# Patient Record
Sex: MALE | Race: WHITE | NOT HISPANIC OR LATINO | Employment: FULL TIME | URBAN - METROPOLITAN AREA
[De-identification: names, ages, dates, MRNs, and addresses within clinical notes are randomized per-mention and may not be internally consistent; named-entity substitution may affect disease eponyms.]

---

## 2022-03-23 ENCOUNTER — HOSPITAL ENCOUNTER (INPATIENT)
Facility: MEDICAL CENTER | Age: 62
LOS: 5 days | DRG: 246 | End: 2022-03-28
Attending: INTERNAL MEDICINE | Admitting: INTERNAL MEDICINE
Payer: COMMERCIAL

## 2022-03-23 DIAGNOSIS — I50.21 ACUTE SYSTOLIC HEART FAILURE (HCC): ICD-10-CM

## 2022-03-23 DIAGNOSIS — I25.10 CORONARY ARTERY DISEASE DUE TO LIPID RICH PLAQUE: ICD-10-CM

## 2022-03-23 DIAGNOSIS — I25.83 CORONARY ARTERY DISEASE DUE TO LIPID RICH PLAQUE: ICD-10-CM

## 2022-03-23 DIAGNOSIS — I42.0 DILATED CARDIOMYOPATHY (HCC): ICD-10-CM

## 2022-03-23 PROBLEM — E11.9 DIABETES (HCC): Status: ACTIVE | Noted: 2022-03-23

## 2022-03-23 PROBLEM — R94.31 ABNORMAL EKG: Status: ACTIVE | Noted: 2022-03-23

## 2022-03-23 PROBLEM — E78.5 DYSLIPIDEMIA: Status: ACTIVE | Noted: 2022-03-23

## 2022-03-23 LAB
APTT PPP: 34.8 SEC (ref 24.7–36)
APTT PPP: 55.5 SEC (ref 24.7–36)
INR PPP: 1 (ref 0.87–1.13)
INR PPP: 1.07 (ref 0.87–1.13)
PROTHROMBIN TIME: 12.9 SEC (ref 12–14.6)
PROTHROMBIN TIME: 13.6 SEC (ref 12–14.6)
TROPONIN T SERPL-MCNC: 18 NG/L (ref 6–19)
UFH PPP CHRO-ACNC: 0.16 IU/ML
UFH PPP CHRO-ACNC: <0.1 IU/ML

## 2022-03-23 PROCEDURE — 36415 COLL VENOUS BLD VENIPUNCTURE: CPT

## 2022-03-23 PROCEDURE — 85520 HEPARIN ASSAY: CPT | Mod: 91

## 2022-03-23 PROCEDURE — 85730 THROMBOPLASTIN TIME PARTIAL: CPT

## 2022-03-23 PROCEDURE — 700102 HCHG RX REV CODE 250 W/ 637 OVERRIDE(OP): Performed by: INTERNAL MEDICINE

## 2022-03-23 PROCEDURE — 99223 1ST HOSP IP/OBS HIGH 75: CPT | Performed by: INTERNAL MEDICINE

## 2022-03-23 PROCEDURE — 84484 ASSAY OF TROPONIN QUANT: CPT

## 2022-03-23 PROCEDURE — 700111 HCHG RX REV CODE 636 W/ 250 OVERRIDE (IP): Performed by: INTERNAL MEDICINE

## 2022-03-23 PROCEDURE — A9270 NON-COVERED ITEM OR SERVICE: HCPCS | Performed by: INTERNAL MEDICINE

## 2022-03-23 PROCEDURE — 770020 HCHG ROOM/CARE - TELE (206)

## 2022-03-23 PROCEDURE — 85610 PROTHROMBIN TIME: CPT | Mod: 91

## 2022-03-23 RX ORDER — HEPARIN SODIUM 5000 [USP'U]/100ML
0-30 INJECTION, SOLUTION INTRAVENOUS CONTINUOUS
Status: DISCONTINUED | OUTPATIENT
Start: 2022-03-23 | End: 2022-03-27

## 2022-03-23 RX ORDER — HEPARIN SODIUM 5000 [USP'U]/100ML
0-30 INJECTION, SOLUTION INTRAVENOUS CONTINUOUS
Status: DISCONTINUED | OUTPATIENT
Start: 2022-03-23 | End: 2022-03-23

## 2022-03-23 RX ORDER — ENALAPRIL MALEATE 2.5 MG/1
2.5 TABLET ORAL TWICE DAILY
Status: DISCONTINUED | OUTPATIENT
Start: 2022-03-23 | End: 2022-03-24

## 2022-03-23 RX ORDER — ACETAMINOPHEN 325 MG/1
650 TABLET ORAL EVERY 6 HOURS PRN
Status: ACTIVE | OUTPATIENT
Start: 2022-03-23 | End: 2022-03-23

## 2022-03-23 RX ORDER — HEPARIN SODIUM 1000 [USP'U]/ML
60 INJECTION, SOLUTION INTRAVENOUS; SUBCUTANEOUS ONCE
Status: DISCONTINUED | OUTPATIENT
Start: 2022-03-23 | End: 2022-03-23

## 2022-03-23 RX ORDER — ONDANSETRON 2 MG/ML
4 INJECTION INTRAMUSCULAR; INTRAVENOUS EVERY 4 HOURS PRN
Status: ACTIVE | OUTPATIENT
Start: 2022-03-23 | End: 2022-03-23

## 2022-03-23 RX ORDER — HEPARIN SODIUM 1000 [USP'U]/ML
40 INJECTION, SOLUTION INTRAVENOUS; SUBCUTANEOUS PRN
Status: DISCONTINUED | OUTPATIENT
Start: 2022-03-23 | End: 2022-03-27

## 2022-03-23 RX ORDER — HEPARIN SODIUM 1000 [USP'U]/ML
30 INJECTION, SOLUTION INTRAVENOUS; SUBCUTANEOUS PRN
Status: DISCONTINUED | OUTPATIENT
Start: 2022-03-23 | End: 2022-03-23

## 2022-03-23 RX ORDER — NITROGLYCERIN 0.4 MG/1
0.4 TABLET SUBLINGUAL
Status: DISCONTINUED | OUTPATIENT
Start: 2022-03-23 | End: 2022-03-28 | Stop reason: HOSPADM

## 2022-03-23 RX ORDER — LABETALOL HYDROCHLORIDE 5 MG/ML
10 INJECTION, SOLUTION INTRAVENOUS EVERY 4 HOURS PRN
Status: ACTIVE | OUTPATIENT
Start: 2022-03-23 | End: 2022-03-23

## 2022-03-23 RX ORDER — ATORVASTATIN CALCIUM 40 MG/1
40 TABLET, FILM COATED ORAL NIGHTLY
COMMUNITY

## 2022-03-23 RX ORDER — MORPHINE SULFATE 4 MG/ML
2-4 INJECTION INTRAVENOUS
Status: DISCONTINUED | OUTPATIENT
Start: 2022-03-23 | End: 2022-03-28 | Stop reason: HOSPADM

## 2022-03-23 RX ADMIN — HEPARIN SODIUM 18 UNITS/KG/HR: 5000 INJECTION, SOLUTION INTRAVENOUS at 22:59

## 2022-03-23 RX ADMIN — ENALAPRIL MALEATE 2.5 MG: 2.5 TABLET ORAL at 23:03

## 2022-03-23 RX ADMIN — METOPROLOL TARTRATE 25 MG: 25 TABLET, FILM COATED ORAL at 23:04

## 2022-03-23 ASSESSMENT — LIFESTYLE VARIABLES
AVERAGE NUMBER OF DAYS PER WEEK YOU HAVE A DRINK CONTAINING ALCOHOL: 0
HAVE YOU EVER FELT YOU SHOULD CUT DOWN ON YOUR DRINKING: NO
TOTAL SCORE: 0
ON A TYPICAL DAY WHEN YOU DRINK ALCOHOL HOW MANY DRINKS DO YOU HAVE: 0
TOTAL SCORE: 0
CONSUMPTION TOTAL: NEGATIVE
TOTAL SCORE: 0
HAVE PEOPLE ANNOYED YOU BY CRITICIZING YOUR DRINKING: NO
ALCOHOL_USE: NO
HOW MANY TIMES IN THE PAST YEAR HAVE YOU HAD 5 OR MORE DRINKS IN A DAY: 0
EVER HAD A DRINK FIRST THING IN THE MORNING TO STEADY YOUR NERVES TO GET RID OF A HANGOVER: NO
DOES PATIENT WANT TO STOP DRINKING: NO
EVER FELT BAD OR GUILTY ABOUT YOUR DRINKING: NO

## 2022-03-23 ASSESSMENT — COGNITIVE AND FUNCTIONAL STATUS - GENERAL
MOBILITY SCORE: 24
SUGGESTED CMS G CODE MODIFIER MOBILITY: CH
DAILY ACTIVITIY SCORE: 24
SUGGESTED CMS G CODE MODIFIER DAILY ACTIVITY: CH

## 2022-03-23 ASSESSMENT — ENCOUNTER SYMPTOMS
INSOMNIA: 0
NAUSEA: 0
FEVER: 0
NECK PAIN: 0
MYALGIAS: 0
VOMITING: 0
DOUBLE VISION: 0
SORE THROAT: 0
HEADACHES: 0
CLAUDICATION: 0
BRUISES/BLEEDS EASILY: 0
HEMOPTYSIS: 0
PALPITATIONS: 0
STRIDOR: 0
WEIGHT LOSS: 0
DEPRESSION: 0
COUGH: 0
DIZZINESS: 0
PND: 0
BLURRED VISION: 0

## 2022-03-23 ASSESSMENT — PATIENT HEALTH QUESTIONNAIRE - PHQ9
SUM OF ALL RESPONSES TO PHQ9 QUESTIONS 1 AND 2: 0
2. FEELING DOWN, DEPRESSED, IRRITABLE, OR HOPELESS: NOT AT ALL
1. LITTLE INTEREST OR PLEASURE IN DOING THINGS: NOT AT ALL

## 2022-03-23 ASSESSMENT — PAIN DESCRIPTION - PAIN TYPE: TYPE: ACUTE PAIN

## 2022-03-23 NOTE — LETTER
March 28, 2022    To Whom It May Concern:         Please accept this letter as confirmation that Makayla Hays was admitted to [facility] on 3/23/22- 3/28/22 for treatment of cardiac conditions. Further treatment of his cardiac conditions will include 12 weeks of cardiac rehab at least 2 days a week, which he can complete at [facility].          If you have any questions please do not hesitate to call me at the phone number listed below.    Sincerely,         LIA Porter.   [facility]  [phone]

## 2022-03-24 ENCOUNTER — APPOINTMENT (OUTPATIENT)
Dept: RADIOLOGY | Facility: MEDICAL CENTER | Age: 62
DRG: 246 | End: 2022-03-24
Attending: GENERAL PRACTICE
Payer: COMMERCIAL

## 2022-03-24 ENCOUNTER — APPOINTMENT (OUTPATIENT)
Dept: CARDIOLOGY | Facility: MEDICAL CENTER | Age: 62
DRG: 246 | End: 2022-03-24
Attending: GENERAL PRACTICE
Payer: COMMERCIAL

## 2022-03-24 PROBLEM — E66.9 OBESITY (BMI 35.0-39.9 WITHOUT COMORBIDITY): Status: ACTIVE | Noted: 2022-03-24

## 2022-03-24 PROBLEM — E11.65 UNCONTROLLED TYPE 2 DIABETES MELLITUS WITH HYPERGLYCEMIA (HCC): Status: ACTIVE | Noted: 2022-03-23

## 2022-03-24 PROBLEM — R94.39 ABNORMAL STRESS TEST: Status: ACTIVE | Noted: 2022-03-24

## 2022-03-24 LAB
AMPHET UR QL SCN: NEGATIVE
BARBITURATES UR QL SCN: NEGATIVE
BENZODIAZ UR QL SCN: NEGATIVE
BZE UR QL SCN: NEGATIVE
CANNABINOIDS UR QL SCN: NEGATIVE
CHOLEST SERPL-MCNC: 104 MG/DL (ref 100–199)
EKG IMPRESSION: NORMAL
EST. AVERAGE GLUCOSE BLD GHB EST-MCNC: 166 MG/DL
HBA1C MFR BLD: 7.4 % (ref 4–5.6)
HDLC SERPL-MCNC: 40 MG/DL
LDLC SERPL CALC-MCNC: 45 MG/DL
LV EJECT FRACT  99904: 35
LV EJECT FRACT  99904: 35
LV EJECT FRACT MOD 2C 99903: 55.53
LV EJECT FRACT MOD 2C 99903: 55.53
LV EJECT FRACT MOD 4C 99902: 46.01
LV EJECT FRACT MOD 4C 99902: 46.01
LV EJECT FRACT MOD BP 99901: 53.11
LV EJECT FRACT MOD BP 99901: 53.11
METHADONE UR QL SCN: NEGATIVE
OPIATES UR QL SCN: NEGATIVE
OXYCODONE UR QL SCN: NEGATIVE
PCP UR QL SCN: NEGATIVE
PROPOXYPH UR QL SCN: NEGATIVE
TRIGL SERPL-MCNC: 94 MG/DL (ref 0–149)
TROPONIN T SERPL-MCNC: 19 NG/L (ref 6–19)
TSH SERPL DL<=0.005 MIU/L-ACNC: 1.52 UIU/ML (ref 0.38–5.33)
UFH PPP CHRO-ACNC: 0.24 IU/ML
UFH PPP CHRO-ACNC: 0.32 IU/ML
UFH PPP CHRO-ACNC: 0.6 IU/ML

## 2022-03-24 PROCEDURE — 80061 LIPID PANEL: CPT

## 2022-03-24 PROCEDURE — 93005 ELECTROCARDIOGRAM TRACING: CPT | Performed by: INTERNAL MEDICINE

## 2022-03-24 PROCEDURE — 700111 HCHG RX REV CODE 636 W/ 250 OVERRIDE (IP)

## 2022-03-24 PROCEDURE — 99233 SBSQ HOSP IP/OBS HIGH 50: CPT | Performed by: GENERAL PRACTICE

## 2022-03-24 PROCEDURE — 83036 HEMOGLOBIN GLYCOSYLATED A1C: CPT

## 2022-03-24 PROCEDURE — 36415 COLL VENOUS BLD VENIPUNCTURE: CPT

## 2022-03-24 PROCEDURE — 93306 TTE W/DOPPLER COMPLETE: CPT

## 2022-03-24 PROCEDURE — A9270 NON-COVERED ITEM OR SERVICE: HCPCS | Performed by: INTERNAL MEDICINE

## 2022-03-24 PROCEDURE — 84484 ASSAY OF TROPONIN QUANT: CPT

## 2022-03-24 PROCEDURE — 700111 HCHG RX REV CODE 636 W/ 250 OVERRIDE (IP): Performed by: INTERNAL MEDICINE

## 2022-03-24 PROCEDURE — A9270 NON-COVERED ITEM OR SERVICE: HCPCS | Performed by: GENERAL PRACTICE

## 2022-03-24 PROCEDURE — 700102 HCHG RX REV CODE 250 W/ 637 OVERRIDE(OP): Performed by: GENERAL PRACTICE

## 2022-03-24 PROCEDURE — 93306 TTE W/DOPPLER COMPLETE: CPT | Mod: 26 | Performed by: INTERNAL MEDICINE

## 2022-03-24 PROCEDURE — 80307 DRUG TEST PRSMV CHEM ANLYZR: CPT

## 2022-03-24 PROCEDURE — 84443 ASSAY THYROID STIM HORMONE: CPT

## 2022-03-24 PROCEDURE — A9502 TC99M TETROFOSMIN: HCPCS

## 2022-03-24 PROCEDURE — 93010 ELECTROCARDIOGRAM REPORT: CPT | Performed by: INTERNAL MEDICINE

## 2022-03-24 PROCEDURE — 700102 HCHG RX REV CODE 250 W/ 637 OVERRIDE(OP): Performed by: INTERNAL MEDICINE

## 2022-03-24 PROCEDURE — 770020 HCHG ROOM/CARE - TELE (206)

## 2022-03-24 PROCEDURE — 82962 GLUCOSE BLOOD TEST: CPT

## 2022-03-24 PROCEDURE — 700105 HCHG RX REV CODE 258: Performed by: INTERNAL MEDICINE

## 2022-03-24 PROCEDURE — 700117 HCHG RX CONTRAST REV CODE 255: Performed by: HOSPITALIST

## 2022-03-24 PROCEDURE — 85520 HEPARIN ASSAY: CPT

## 2022-03-24 RX ORDER — ATORVASTATIN CALCIUM 40 MG/1
40 TABLET, FILM COATED ORAL EVERY EVENING
Status: DISCONTINUED | OUTPATIENT
Start: 2022-03-24 | End: 2022-03-28 | Stop reason: HOSPADM

## 2022-03-24 RX ORDER — REGADENOSON 0.08 MG/ML
INJECTION, SOLUTION INTRAVENOUS
Status: COMPLETED
Start: 2022-03-24 | End: 2022-03-24

## 2022-03-24 RX ORDER — NITROGLYCERIN 0.4 MG/1
0.4 TABLET SUBLINGUAL
Status: DISCONTINUED | OUTPATIENT
Start: 2022-03-24 | End: 2022-03-24

## 2022-03-24 RX ORDER — GLYBURIDE 5 MG/1
5 TABLET ORAL 2 TIMES DAILY WITH MEALS
Status: DISCONTINUED | OUTPATIENT
Start: 2022-03-24 | End: 2022-03-24

## 2022-03-24 RX ORDER — REGADENOSON 0.08 MG/ML
0.4 INJECTION, SOLUTION INTRAVENOUS ONCE
Status: COMPLETED | OUTPATIENT
Start: 2022-03-24 | End: 2022-03-24

## 2022-03-24 RX ORDER — SODIUM CHLORIDE 9 MG/ML
INJECTION, SOLUTION INTRAVENOUS CONTINUOUS
Status: DISCONTINUED | OUTPATIENT
Start: 2022-03-24 | End: 2022-03-27

## 2022-03-24 RX ORDER — LISINOPRIL 5 MG/1
5 TABLET ORAL
Status: DISCONTINUED | OUTPATIENT
Start: 2022-03-25 | End: 2022-03-28 | Stop reason: HOSPADM

## 2022-03-24 RX ADMIN — METOPROLOL TARTRATE 25 MG: 25 TABLET, FILM COATED ORAL at 17:30

## 2022-03-24 RX ADMIN — REGADENOSON 0.4 MG: 0.08 INJECTION, SOLUTION INTRAVENOUS at 11:03

## 2022-03-24 RX ADMIN — ENALAPRIL MALEATE 2.5 MG: 2.5 TABLET ORAL at 05:27

## 2022-03-24 RX ADMIN — ATORVASTATIN CALCIUM 40 MG: 40 TABLET, FILM COATED ORAL at 17:30

## 2022-03-24 RX ADMIN — HEPARIN SODIUM 20 UNITS/KG/HR: 5000 INJECTION, SOLUTION INTRAVENOUS at 13:36

## 2022-03-24 RX ADMIN — SODIUM CHLORIDE: 9 INJECTION, SOLUTION INTRAVENOUS at 20:20

## 2022-03-24 RX ADMIN — HEPARIN SODIUM 3600 UNITS: 1000 INJECTION, SOLUTION INTRAVENOUS; SUBCUTANEOUS at 13:36

## 2022-03-24 RX ADMIN — INSULIN HUMAN 1 UNITS: 100 INJECTION, SOLUTION PARENTERAL at 20:15

## 2022-03-24 RX ADMIN — METOPROLOL TARTRATE 25 MG: 25 TABLET, FILM COATED ORAL at 05:27

## 2022-03-24 RX ADMIN — HUMAN ALBUMIN MICROSPHERES AND PERFLUTREN 3 ML: 10; .22 INJECTION, SOLUTION INTRAVENOUS at 17:20

## 2022-03-24 RX ADMIN — ASPIRIN 81 MG: 81 TABLET, COATED ORAL at 05:27

## 2022-03-24 ASSESSMENT — PATIENT HEALTH QUESTIONNAIRE - PHQ9
2. FEELING DOWN, DEPRESSED, IRRITABLE, OR HOPELESS: NOT AT ALL
1. LITTLE INTEREST OR PLEASURE IN DOING THINGS: NOT AT ALL
SUM OF ALL RESPONSES TO PHQ9 QUESTIONS 1 AND 2: 0

## 2022-03-24 ASSESSMENT — PAIN DESCRIPTION - PAIN TYPE: TYPE: ACUTE PAIN

## 2022-03-24 ASSESSMENT — ENCOUNTER SYMPTOMS: SHORTNESS OF BREATH: 1

## 2022-03-24 NOTE — PROGRESS NOTES
Notified by monitor room regarding concern of ST elevation. 2 RN verification of ST elevation. Stat EKG ordered. Pt asymptomatic and hemodynamically stable. VS: 108/70, 73, 20.     MD updated. Per MD repeat trop ordered.

## 2022-03-24 NOTE — ASSESSMENT & PLAN NOTE
EKG noted 1 mm ST elevation throughout V2 through V6.  Cardiology was consulted who recommended transfer to our facility.

## 2022-03-24 NOTE — DISCHARGE PLANNING
Anticipated Discharge Disposition: Likely Home    Action: Lsw met with pt and family at bedside to complete assessment. Facesheet blank, pt was able to provide information. Pt lives at [facility] with spouse Manjula and has support from family. Pt's PCP is Mendoza at [facility], [place]. Pt is completely independent with ADL/iADLs and continues to drive. Pt has no DME. No noted hx of sa or mh.     Pt is AOx4 and on RA. Pt 6 clicks score is 24. Anticipate pt to dc home with no needs.     Barriers to Discharge: medical clearance    Plan: continue to follow

## 2022-03-24 NOTE — PROGRESS NOTES
4 Eyes Skin Assessment Completed by FLO Wilkes and FLO Solo.    Head WDL  Ears WDL  Nose WDL  Mouth WDL  Neck WDL  Breast/Chest WDL  Shoulder Blades WDL  Spine WDL  (R) Arm/Elbow/Hand Abrasion/scab  to right forearm proximal to wrist.   (L) Arm/Elbow/Hand WDL  Abdomen WDL  Groin Redness and Blanching  Scrotum/Coccyx/Buttocks Redness and Blanching  (R) Leg WDL  (L) Leg WDL  (R) Heel/Foot/Toe WDL  (L) Heel/Foot/Toe WDL     Skin grossly intact, dry and fragile as expected with age. Moisturizer and education provided.     Devices In Places Tele Box, Blood Pressure Cuff and Pulse Ox      Interventions In Place Pillows and Pressure Redistribution Mattress    Possible Skin Injury No    Pictures Uploaded Into Epic N/A  Wound Consult Placed N/A  RN Wound Prevention Protocol Ordered No

## 2022-03-24 NOTE — CONSULTS
Reason of Consult: Chest pain    Consulting Physician: Dr. Oconnell    HPI:  61M with HLP and Dm who presents with exertional fatigue and TALBOT 3/23. Normal troponin evaluation. Stress positive for apical infarct with melecio-infarct ischemia read by radiology and reduced LVEF. No drug use, occasional A, 2 cigaret/day tobacco use and no FHx early CAD. No symptoms at rest.    Past Medical History:   Diagnosis Date   • Cataract    • Diabetes (HCC)    • Indigestion        Social History     Socioeconomic History   • Marital status:      Spouse name: Not on file   • Number of children: Not on file   • Years of education: Not on file   • Highest education level: Not on file   Occupational History   • Not on file   Tobacco Use   • Smoking status: Never Smoker   • Smokeless tobacco: Never Used   Vaping Use   • Vaping Use: Never used   Substance and Sexual Activity   • Alcohol use: Not on file   • Drug use: Never   • Sexual activity: Not on file   Other Topics Concern   • Not on file   Social History Narrative   • Not on file     Social Determinants of Health     Financial Resource Strain: Not on file   Food Insecurity: Not on file   Transportation Needs: Not on file   Physical Activity: Not on file   Stress: Not on file   Social Connections: Not on file   Intimate Partner Violence: Not on file   Housing Stability: Not on file       No current facility-administered medications on file prior to encounter.     Current Outpatient Medications on File Prior to Encounter   Medication Sig Dispense Refill   • GLYBURIDE MICRONIZED PO Take 5 mg by mouth 3 times a day with meals.     • metformin (GLUCOPHAGE) 1000 MG tablet Take 1,000 mg by mouth 2 times a day with meals.     • atorvastatin (LIPITOR) 40 MG Tab Take 40 mg by mouth every evening.         Current Facility-Administered Medications   Medication Dose Frequency Provider Last Rate Last Admin   • atorvastatin (LIPITOR) tablet 40 mg  40 mg Q EVENING Tasha Diamond D.O.       •  "[START ON 3/25/2022] lisinopril (PRINIVIL) tablet 5 mg  5 mg Q DAY Tasha Diamond D.O.       • influenza vaccine quad (FLUZONE/FLUARIX) injection 0.5 mL  0.5 mL Once PRN Tasha Diamond D.O.       • aspirin EC (ECOTRIN) tablet 81 mg  81 mg DAILY Baldomero Oconnell M.D.   81 mg at 03/24/22 0527   • metoprolol tartrate (LOPRESSOR) tablet 25 mg  25 mg TWICE DAILY Baldomero Oconnell M.D.   25 mg at 03/24/22 0527   • morphine 4 MG/ML injection 2-4 mg  2-4 mg Q5 MIN PRN Baldomero Oconnell M.D.       • nitroglycerin (NITROSTAT) tablet 0.4 mg  0.4 mg Q5 MIN PRN Baldomero Oconnell M.D.       • heparin infusion 25,000 units in 500 mL 0.45% NACL  0-30 Units/kg/hr (Adjusted) Continuous Baldomero Oconnell M.D. 35.9 mL/hr at 03/24/22 1336 20 Units/kg/hr at 03/24/22 1336   • heparin injection 3,600 Units  40 Units/kg (Adjusted) PRN Baldomero Oconnell M.D.   3,600 Units at 03/24/22 1336   Last reviewed on 3/23/2022 11:54 PM by Katrin Jacobs R.N.      Patient has no known allergies.    History reviewed. No pertinent family history.    ROS: As per HPI all other systems reviewed and negative     Physical Exam   Blood pressure 113/72, pulse 78, temperature 36.7 °C (98.1 °F), temperature source Temporal, resp. rate 18, height 1.778 m (5' 10\"), weight 115 kg (252 lb 6.8 oz), SpO2 93 %.    Constitutional:  Appears well-developed.   HENT: Normocephalic and atraumatic. No scleral icterus.   Neck: No JVD present.   Cardiovascular: Normal rate. Exam reveals no gallop and no friction rub. No murmur heard.   Pulmonary/Chest: CTAB    Abdominal: S/NT/ND BS+   Musculoskeletal:  Pulses present. No atrophy. Strength normal.  Extremities: Exhibits no edema. No clubbing or cyanosis.   Skin: Skin is warm and dry.   Neuro: Non-focal, CN 2-12 intact grossly      Intake/Output Summary (Last 24 hours) at 3/24/2022 1525  Last data filed at 3/24/2022 1336  Gross per 24 hour   Intake 982.12 ml   Output 240 ml   Net 742.12 ml               Recent Labs     03/23/22  2110 " 03/23/22  2242   APTT 55.5* 34.8   INR 1.07 1.00             Recent Labs     03/24/22  0406   TRIGLYCERIDE 94   HDL 40   LDL 45       EKG (3/24):  I have personally reviewed the EKG this visit and discussed with the patient. It shows SR anterior infarct, possibly recent. no st depression.    STRESS (3/24/22):   Apical infarct with melecio-infarct ischemia particularly inferiorly.    Left ventricle is dilated with global hypokinesis.   Left ventricle ejection fraction moderately reduced at 35%   Elevated TID value.    Imaging reviewed    Impressions:  1. Exertional fatigue and TALBOT progressive x2 months  2. Positive stress test, high risk findings  3. Hyperlipidemia  4. Cardiomyopathy, new diagnosis    Recommendations:  Coronary angiography with possible PCI tomorrow  Echocardiogram  Statin, beta blocker, ACE-I and up-titrate as tolerated.  Aspirin    I have discussed the risks and benefits of cardiac catheterization as well as the procedure itself, rationale and appropriateness in detail with the patient today. Complications including but not limited to death, stroke, MI, urgent bypass surgery, contrast nephropathy, vascular complications, bleeding and infection were explained to the patient. The potential outcomes associated with the procedure (possible PCI, possible CABG, possible medical Rx only) were also discussed at length. The patient agrees to proceed.    Thank you for this interesting consultation. It was my pleasure to see Makayla Hays today.    Quinn Blackmon MD, FACC, [facility]  Division of Interventional Cardiology  [facility]      Discussed with the referring physician and bedside nursing.

## 2022-03-24 NOTE — PROGRESS NOTES
Report received from FLO Wilkes and care of patient assumed. Pt resting comfortably in bed without any signs of distress. A&Ox4, VSS, no complaints at this time. Heparin drip infusing through PIV without issues. POC reviewed and white board updated, Tele box on, Call light within reach, Bed locked in lowest position and side rails up x2. Will monitor.

## 2022-03-24 NOTE — ASSESSMENT & PLAN NOTE
Patient with hemoglobin A1c of 7.6, he reports when he was initially diagnosed his hemoglobin A1c was 11  Upon discharge she will resume his Metformin and glyburide and nocturnal Lantus  While in house we will continue with Lantus and insulin sliding scale, hypoglycemia protocol in place  Carb consistent/cardiac diet

## 2022-03-24 NOTE — CARE PLAN
The patient is Watcher - Medium risk of patient condition declining or worsening    Shift Goals  Clinical Goals: Monitor and control blood sugar, monitor labs and chest pain  Patient Goals: Comfort  Family Goals: ISIS. No family present.    Progress made toward(s) clinical / shift goals:      Problem: Knowledge Deficit - Standard  Goal: Patient and family/care givers will demonstrate understanding of plan of care, disease process/condition, diagnostic tests and medications  Outcome: Progressing       Patient is not progressing towards the following goals: NA

## 2022-03-24 NOTE — CARE PLAN
The patient is Watcher - Medium risk of patient condition declining or worsening    Shift Goals  Clinical Goals: Stress test, monitor labs, VS  Patient Goals: comfort  Family Goals: ISIS    Progress made toward(s) clinical / shift goals:    Problem: Knowledge Deficit - Standard  Goal: Patient and family/care givers will demonstrate understanding of plan of care, disease process/condition, diagnostic tests and medications  Outcome: Progressing     Problem: Psychosocial  Goal: Patient's level of anxiety will decrease  Outcome: Progressing  Goal: Patient's ability to verbalize feelings about condition will improve  Outcome: Progressing  Goal: Patient's ability to re-evaluate and adapt role responsibilities will improve  Outcome: Progressing     Problem: Communication  Goal: The ability to communicate needs accurately and effectively will improve  Outcome: Progressing     Problem: Respiratory  Goal: Patient will achieve/maintain optimum respiratory ventilation and gas exchange  Outcome: Progressing     Problem: Self Care  Goal: Patient will have the ability to perform ADLs independently or with assistance (bathe, groom, dress, toilet and feed)  Outcome: Progressing     Problem: Infection - Standard  Goal: Patient will remain free from infection  Outcome: Progressing       Patient is not progressing towards the following goals:

## 2022-03-24 NOTE — H&P
Hospital Medicine History & Physical Note    Date of Service  3/23/2022    Primary Care Physician  No primary care provider on file.    Consultants  cardiology    Specialist Names: Dr. Miguel    Code Status  Full Code    Chief Complaint  Generalized weakness    History of Presenting Illness  Makayla Hays is a 61 y.o. male with history of dyslipidemia, diabetes and diverticulosis who presented 3/23/2022 with several days of exceptional exertional fatigue prompting him to seek evaluation at primary care was found to have an abnormal EKG concerning for 1 mm ST elevation of V2 through 6.  He was referred to the emergency department at [facility] with persistent symptoms and EKG changes cardiologist at [facility] Dr. Barrow was consulted recommending transfer to tertiary care for evaluation.  He is started on IV heparin in route and transferred as a direct admit.  At bedside he is awake and alert he is oriented x3, conversational in no acute distress.  Troponin assay from [facility] is 17, normal range is between 4 and 60.  Patient admits dietary indiscretion with regard to diabetes .  He denies current medication use or regular glycemic checks.  He denies previous cardiac work-up.    I discussed the plan of care with patient.    Review of Systems  Review of Systems   Constitutional: Positive for malaise/fatigue. Negative for fever and weight loss.   HENT: Negative for sore throat and tinnitus.    Eyes: Negative for blurred vision and double vision.   Respiratory: Negative for cough, hemoptysis and stridor.    Cardiovascular: Negative for chest pain, palpitations, claudication, leg swelling and PND.   Gastrointestinal: Negative for nausea and vomiting.   Genitourinary: Negative for dysuria and urgency.   Musculoskeletal: Negative for myalgias and neck pain.   Skin: Negative for itching and rash.   Neurological: Negative for dizziness and headaches.   Endo/Heme/Allergies: Does not bruise/bleed easily.    Psychiatric/Behavioral: Negative for depression. The patient does not have insomnia.        Past Medical History  Dyslipidemia, diabetes.  Diverticulosis    Surgical History  Denies    Family History  Hypertension  Family history reviewed with patient. There is no family history that is pertinent to the chief complaint.     Social History   Denies alcohol, tobacco, recreational drugs or regular physical activity.    Allergies  Denies    Medications  None       Physical Exam  Temp:  [36.2 °C (97.2 °F)] 36.2 °C (97.2 °F)  Pulse:  [91-92] 91  Resp:  [20] 20  BP: (123-129)/(79-83) 129/83  SpO2:  [93 %] 93 %  Blood Pressure: 129/83   Temperature: 36.2 °C (97.2 °F)   Pulse: 91   Respiration: 20   Pulse Oximetry: 93 %       Physical Exam  Vitals and nursing note reviewed.   Constitutional:       General: He is not in acute distress.     Appearance: Normal appearance. He is normal weight. He is not toxic-appearing.   HENT:      Head: Normocephalic and atraumatic.      Nose: Nose normal. No congestion or rhinorrhea.      Mouth/Throat:      Mouth: Mucous membranes are moist.      Pharynx: Oropharynx is clear.   Eyes:      Extraocular Movements: Extraocular movements intact.      Conjunctiva/sclera: Conjunctivae normal.      Pupils: Pupils are equal, round, and reactive to light.   Neck:      Vascular: No carotid bruit.   Cardiovascular:      Rate and Rhythm: Normal rate and regular rhythm.      Pulses: Normal pulses.      Heart sounds: Normal heart sounds. No murmur heard.    No gallop.   Pulmonary:      Effort: No respiratory distress.      Breath sounds: Normal breath sounds. No wheezing or rales.   Abdominal:      General: Abdomen is flat. Bowel sounds are normal. There is no distension.      Palpations: Abdomen is soft. There is no mass.      Tenderness: There is no abdominal tenderness.      Hernia: No hernia is present.   Musculoskeletal:         General: No tenderness or signs of injury.      Cervical back: Normal  range of motion and neck supple. No muscular tenderness.   Lymphadenopathy:      Cervical: No cervical adenopathy.   Skin:     Capillary Refill: Capillary refill takes less than 2 seconds.      Coloration: Skin is not jaundiced or pale.      Findings: No bruising.   Neurological:      General: No focal deficit present.      Mental Status: He is alert and oriented to person, place, and time. Mental status is at baseline.      Cranial Nerves: No cranial nerve deficit.      Motor: No weakness.      Coordination: Coordination normal.   Psychiatric:         Mood and Affect: Mood normal.         Thought Content: Thought content normal.         Judgment: Judgment normal.         Laboratory:          No results for input(s): ALTSGPT, ASTSGOT, ALKPHOSPHAT, TBILIRUBIN, DBILIRUBIN, GAMMAGT, AMYLASE, LIPASE, ALB, PREALBUMIN, GLUCOSE in the last 72 hours.  Recent Labs     03/23/22 2110   APTT 55.5*   INR 1.07     No results for input(s): NTPROBNP in the last 72 hours.      Recent Labs     03/23/22 2110   TROPONINT 18       Imaging:  No orders to display       EKG:  I have personally reviewed the images and compared with prior images. and My impression is: Normal variant    Assessment/Plan:  I anticipate this patient is appropriate for observation status at this time.    * Abnormal EKG- (present on admission)  Assessment & Plan  Chest pain care set, continue IV heparin.  Follow-up serial troponin, TSH, lipid profile and Cardiolite stress.    Dyslipidemia  Assessment & Plan  Atorvastatin, follow-up lipid profile.    Diabetes (HCC)  Assessment & Plan  Regular insulin sliding scale before every meal as needed, follow-up A1c      VTE prophylaxis: SCDs/TEDs

## 2022-03-25 ENCOUNTER — APPOINTMENT (OUTPATIENT)
Dept: CARDIOLOGY | Facility: MEDICAL CENTER | Age: 62
DRG: 246 | End: 2022-03-25
Attending: INTERNAL MEDICINE
Payer: COMMERCIAL

## 2022-03-25 LAB
ACT BLD: 148 SEC (ref 74–137)
ALBUMIN SERPL BCP-MCNC: 3.6 G/DL (ref 3.2–4.9)
ALBUMIN/GLOB SERPL: 1.4 G/DL
ALP SERPL-CCNC: 97 U/L (ref 30–99)
ALT SERPL-CCNC: 17 U/L (ref 2–50)
ANION GAP SERPL CALC-SCNC: 10 MMOL/L (ref 7–16)
ANION GAP SERPL CALC-SCNC: 11 MMOL/L (ref 7–16)
APTT PPP: 106.3 SEC (ref 24.7–36)
AST SERPL-CCNC: 17 U/L (ref 12–45)
BILIRUB SERPL-MCNC: 0.3 MG/DL (ref 0.1–1.5)
BUN SERPL-MCNC: 10 MG/DL (ref 8–22)
BUN SERPL-MCNC: 7 MG/DL (ref 8–22)
CALCIUM SERPL-MCNC: 8.3 MG/DL (ref 8.5–10.5)
CALCIUM SERPL-MCNC: 8.5 MG/DL (ref 8.5–10.5)
CHLORIDE SERPL-SCNC: 105 MMOL/L (ref 96–112)
CHLORIDE SERPL-SCNC: 107 MMOL/L (ref 96–112)
CO2 SERPL-SCNC: 21 MMOL/L (ref 20–33)
CO2 SERPL-SCNC: 21 MMOL/L (ref 20–33)
CREAT SERPL-MCNC: 0.59 MG/DL (ref 0.5–1.4)
CREAT SERPL-MCNC: 0.68 MG/DL (ref 0.5–1.4)
ERYTHROCYTE [DISTWIDTH] IN BLOOD BY AUTOMATED COUNT: 37.8 FL (ref 35.9–50)
ERYTHROCYTE [DISTWIDTH] IN BLOOD BY AUTOMATED COUNT: 37.9 FL (ref 35.9–50)
GFR SERPLBLD CREATININE-BSD FMLA CKD-EPI: 106 ML/MIN/1.73 M 2
GFR SERPLBLD CREATININE-BSD FMLA CKD-EPI: 111 ML/MIN/1.73 M 2
GLOBULIN SER CALC-MCNC: 2.6 G/DL (ref 1.9–3.5)
GLUCOSE BLD STRIP.AUTO-MCNC: 114 MG/DL (ref 65–99)
GLUCOSE BLD STRIP.AUTO-MCNC: 147 MG/DL (ref 65–99)
GLUCOSE BLD STRIP.AUTO-MCNC: 149 MG/DL (ref 65–99)
GLUCOSE BLD STRIP.AUTO-MCNC: 159 MG/DL (ref 65–99)
GLUCOSE BLD STRIP.AUTO-MCNC: 192 MG/DL (ref 65–99)
GLUCOSE SERPL-MCNC: 151 MG/DL (ref 65–99)
GLUCOSE SERPL-MCNC: 195 MG/DL (ref 65–99)
HCT VFR BLD AUTO: 43.8 % (ref 42–52)
HCT VFR BLD AUTO: 44.4 % (ref 42–52)
HGB BLD-MCNC: 14.4 G/DL (ref 14–18)
HGB BLD-MCNC: 14.7 G/DL (ref 14–18)
INR PPP: 1.12 (ref 0.87–1.13)
MAGNESIUM SERPL-MCNC: 2.2 MG/DL (ref 1.5–2.5)
MCH RBC QN AUTO: 26.5 PG (ref 27–33)
MCH RBC QN AUTO: 26.7 PG (ref 27–33)
MCHC RBC AUTO-ENTMCNC: 32.9 G/DL (ref 33.7–35.3)
MCHC RBC AUTO-ENTMCNC: 33.1 G/DL (ref 33.7–35.3)
MCV RBC AUTO: 80.6 FL (ref 81.4–97.8)
MCV RBC AUTO: 80.7 FL (ref 81.4–97.8)
PLATELET # BLD AUTO: 193 K/UL (ref 164–446)
PLATELET # BLD AUTO: 200 K/UL (ref 164–446)
PMV BLD AUTO: 10.1 FL (ref 9–12.9)
PMV BLD AUTO: 10.2 FL (ref 9–12.9)
POTASSIUM SERPL-SCNC: 3.9 MMOL/L (ref 3.6–5.5)
POTASSIUM SERPL-SCNC: 4.2 MMOL/L (ref 3.6–5.5)
PROT SERPL-MCNC: 6.2 G/DL (ref 6–8.2)
PROTHROMBIN TIME: 14.1 SEC (ref 12–14.6)
RBC # BLD AUTO: 5.43 M/UL (ref 4.7–6.1)
RBC # BLD AUTO: 5.51 M/UL (ref 4.7–6.1)
SODIUM SERPL-SCNC: 137 MMOL/L (ref 135–145)
SODIUM SERPL-SCNC: 138 MMOL/L (ref 135–145)
UFH PPP CHRO-ACNC: 0.49 IU/ML
WBC # BLD AUTO: 7.3 K/UL (ref 4.8–10.8)
WBC # BLD AUTO: 8.5 K/UL (ref 4.8–10.8)

## 2022-03-25 PROCEDURE — 85610 PROTHROMBIN TIME: CPT

## 2022-03-25 PROCEDURE — 36415 COLL VENOUS BLD VENIPUNCTURE: CPT

## 2022-03-25 PROCEDURE — 85520 HEPARIN ASSAY: CPT

## 2022-03-25 PROCEDURE — A9270 NON-COVERED ITEM OR SERVICE: HCPCS | Performed by: INTERNAL MEDICINE

## 2022-03-25 PROCEDURE — 82962 GLUCOSE BLOOD TEST: CPT

## 2022-03-25 PROCEDURE — 85347 COAGULATION TIME ACTIVATED: CPT

## 2022-03-25 PROCEDURE — 700105 HCHG RX REV CODE 258: Performed by: INTERNAL MEDICINE

## 2022-03-25 PROCEDURE — 99152 MOD SED SAME PHYS/QHP 5/>YRS: CPT | Performed by: INTERNAL MEDICINE

## 2022-03-25 PROCEDURE — 700101 HCHG RX REV CODE 250

## 2022-03-25 PROCEDURE — 83735 ASSAY OF MAGNESIUM: CPT

## 2022-03-25 PROCEDURE — 93458 L HRT ARTERY/VENTRICLE ANGIO: CPT

## 2022-03-25 PROCEDURE — 700111 HCHG RX REV CODE 636 W/ 250 OVERRIDE (IP)

## 2022-03-25 PROCEDURE — 80048 BASIC METABOLIC PNL TOTAL CA: CPT

## 2022-03-25 PROCEDURE — 93458 L HRT ARTERY/VENTRICLE ANGIO: CPT | Mod: 26 | Performed by: INTERNAL MEDICINE

## 2022-03-25 PROCEDURE — 99232 SBSQ HOSP IP/OBS MODERATE 35: CPT | Performed by: GENERAL PRACTICE

## 2022-03-25 PROCEDURE — A9270 NON-COVERED ITEM OR SERVICE: HCPCS | Performed by: GENERAL PRACTICE

## 2022-03-25 PROCEDURE — 85730 THROMBOPLASTIN TIME PARTIAL: CPT

## 2022-03-25 PROCEDURE — B2111ZZ FLUOROSCOPY OF MULTIPLE CORONARY ARTERIES USING LOW OSMOLAR CONTRAST: ICD-10-PCS | Performed by: INTERNAL MEDICINE

## 2022-03-25 PROCEDURE — 770020 HCHG ROOM/CARE - TELE (206)

## 2022-03-25 PROCEDURE — 700117 HCHG RX CONTRAST REV CODE 255: Performed by: INTERNAL MEDICINE

## 2022-03-25 PROCEDURE — 700111 HCHG RX REV CODE 636 W/ 250 OVERRIDE (IP): Performed by: INTERNAL MEDICINE

## 2022-03-25 PROCEDURE — 85027 COMPLETE CBC AUTOMATED: CPT | Mod: 91

## 2022-03-25 PROCEDURE — 700102 HCHG RX REV CODE 250 W/ 637 OVERRIDE(OP): Performed by: GENERAL PRACTICE

## 2022-03-25 PROCEDURE — 80053 COMPREHEN METABOLIC PANEL: CPT

## 2022-03-25 PROCEDURE — 700102 HCHG RX REV CODE 250 W/ 637 OVERRIDE(OP): Performed by: INTERNAL MEDICINE

## 2022-03-25 RX ORDER — LIDOCAINE HYDROCHLORIDE 20 MG/ML
INJECTION, SOLUTION EPIDURAL; INFILTRATION; INTRACAUDAL; PERINEURAL
Status: COMPLETED
Start: 2022-03-25 | End: 2022-03-25

## 2022-03-25 RX ORDER — HEPARIN SODIUM 1000 [USP'U]/ML
INJECTION, SOLUTION INTRAVENOUS; SUBCUTANEOUS
Status: COMPLETED
Start: 2022-03-25 | End: 2022-03-25

## 2022-03-25 RX ORDER — VERAPAMIL HYDROCHLORIDE 2.5 MG/ML
INJECTION, SOLUTION INTRAVENOUS
Status: COMPLETED
Start: 2022-03-25 | End: 2022-03-25

## 2022-03-25 RX ORDER — HEPARIN SODIUM 200 [USP'U]/100ML
INJECTION, SOLUTION INTRAVENOUS
Status: COMPLETED
Start: 2022-03-25 | End: 2022-03-25

## 2022-03-25 RX ORDER — MIDAZOLAM HYDROCHLORIDE 1 MG/ML
INJECTION INTRAMUSCULAR; INTRAVENOUS
Status: COMPLETED
Start: 2022-03-25 | End: 2022-03-25

## 2022-03-25 RX ADMIN — MIDAZOLAM HYDROCHLORIDE 1.5 MG: 1 INJECTION, SOLUTION INTRAMUSCULAR; INTRAVENOUS at 16:55

## 2022-03-25 RX ADMIN — HEPARIN SODIUM 20 UNITS/KG/HR: 5000 INJECTION, SOLUTION INTRAVENOUS at 20:43

## 2022-03-25 RX ADMIN — HEPARIN SODIUM 20 UNITS/KG/HR: 5000 INJECTION, SOLUTION INTRAVENOUS at 18:02

## 2022-03-25 RX ADMIN — IOHEXOL 25 ML: 350 INJECTION, SOLUTION INTRAVENOUS at 16:59

## 2022-03-25 RX ADMIN — HEPARIN SODIUM: 1000 INJECTION, SOLUTION INTRAVENOUS; SUBCUTANEOUS at 16:39

## 2022-03-25 RX ADMIN — LISINOPRIL 5 MG: 5 TABLET ORAL at 05:12

## 2022-03-25 RX ADMIN — SODIUM CHLORIDE: 9 INJECTION, SOLUTION INTRAVENOUS at 14:28

## 2022-03-25 RX ADMIN — VERAPAMIL HYDROCHLORIDE 5 MG: 2.5 INJECTION, SOLUTION INTRAVENOUS at 16:39

## 2022-03-25 RX ADMIN — METOPROLOL TARTRATE 25 MG: 25 TABLET, FILM COATED ORAL at 05:12

## 2022-03-25 RX ADMIN — METOPROLOL TARTRATE 25 MG: 25 TABLET, FILM COATED ORAL at 17:36

## 2022-03-25 RX ADMIN — ASPIRIN 81 MG: 81 TABLET, COATED ORAL at 05:12

## 2022-03-25 RX ADMIN — HEPARIN SODIUM 2000 UNITS: 200 INJECTION, SOLUTION INTRAVENOUS at 16:37

## 2022-03-25 RX ADMIN — NITROGLYCERIN 10 ML: 20 INJECTION INTRAVENOUS at 16:40

## 2022-03-25 RX ADMIN — LIDOCAINE HYDROCHLORIDE 2.5 ML: 20 INJECTION, SOLUTION EPIDURAL; INFILTRATION; INTRACAUDAL; PERINEURAL at 16:39

## 2022-03-25 RX ADMIN — HEPARIN SODIUM 2000 UNITS: 1000 INJECTION, SOLUTION INTRAVENOUS; SUBCUTANEOUS at 17:01

## 2022-03-25 RX ADMIN — FENTANYL CITRATE 75 MCG: 50 INJECTION, SOLUTION INTRAMUSCULAR; INTRAVENOUS at 16:55

## 2022-03-25 RX ADMIN — HEPARIN SODIUM 20 UNITS/KG/HR: 5000 INJECTION, SOLUTION INTRAVENOUS at 03:42

## 2022-03-25 RX ADMIN — ATORVASTATIN CALCIUM 40 MG: 40 TABLET, FILM COATED ORAL at 17:36

## 2022-03-25 ASSESSMENT — ENCOUNTER SYMPTOMS: SHORTNESS OF BREATH: 1

## 2022-03-25 NOTE — PROGRESS NOTES
Bedside report received and patient care assumed. Pt is resting in bed, A&Ox4, with no complaints of pain, and is on RA. Tele box on. All fall precautions are in place, belongings at bedside table.  Pt was updated on POC, no questions or concerns. Pt educated on use of call light for assistance.

## 2022-03-25 NOTE — CARE PLAN
The patient is Watcher - Medium risk of patient condition declining or worsening    Shift Goals  Clinical Goals: Maintain hemodynamically stable, monitor labs, titrate heparin gtt, admin IVF prior to cath, NPO @ 0000  Patient Goals: Comfort  Family Goals: ISIS. No family present.    Progress made toward(s) clinical / shift goals:      Problem: Discharge Barriers/Planning  Goal: Patient's continuum of care needs are met  Outcome: Progressing     Problem: Hemodynamics  Goal: Patient's hemodynamics, fluid balance and neurologic status will be stable or improve  Outcome: Progressing       Patient is not progressing towards the following goals: NA

## 2022-03-25 NOTE — PROGRESS NOTES
Bedside report received from NOC RN. Assumed care of pt. Pt awake, laying in bed. A/Ox4, VSS. No concerns, complaints or distress. Pt educated to call before getting out of bed. POC reviewed and white board updated. Tele box on. SR68 on the monitor. Call light in reach. Bed locked in lowest position with 2 upper bed rails up. Bed alarm on.

## 2022-03-25 NOTE — PROGRESS NOTES
Hospital Medicine Daily Progress Note    Date of Service  3/25/2022    Chief Complaint  Makayla Hays is a 61 y.o. male admitted 3/23/2022 with shortness of breath    Hospital Course  This is a 61 year old male with PMHx of hyperlipidemia, type 2 diabetes with A1c of 7.6, and obesity who was transferred from an outside facility on 03/23/2022 with exertional dyspnea and an abnormal EKG noted at PCP's office.    EKG noted 1 mm ST elevation throughout V2 through V6.  Cardiology was consulted who recommended transfer to our facility.  Patient started on a heparin drip. Troponin of 18, 19.  Stress test positive noted apical infarct with melecio-infarct ischemia localized inferiorly, global hypokinesis, LVEF of 35%, with diffuse borderline ST elevation. ECHO pending. UDS negative.    Cardiology consulted, patient is for cardiac catheterization on 03/25.    Interval Problem Update   Patient is for cardiac catheterization today.    I have personally seen and examined the patient at bedside. I discussed the plan of care with patient, family, bedside RN, charge RN,  and pharmacy.     Consultants/Specialty  cardiology    Code Status  Full Code    Disposition  Patient is not medically cleared for discharge.   Anticipate discharge to to home with close outpatient follow-up.  I have placed the appropriate orders for post-discharge needs.    Review of Systems  Review of Systems   Respiratory: Positive for shortness of breath.    All other systems reviewed and are negative.       Physical Exam  Temp:  [36.1 °C (96.9 °F)-36.9 °C (98.5 °F)] 36.1 °C (96.9 °F)  Pulse:  [62-83] 73  Resp:  [16-18] 18  BP: ()/(61-74) 120/65  SpO2:  [90 %-95 %] 95 %    Physical Exam  Vitals and nursing note reviewed.   Constitutional:       General: He is not in acute distress.     Appearance: Normal appearance. He is obese.   HENT:      Head: Normocephalic and atraumatic.   Eyes:      Extraocular Movements: Extraocular movements intact.       Conjunctiva/sclera: Conjunctivae normal.      Pupils: Pupils are equal, round, and reactive to light.   Cardiovascular:      Rate and Rhythm: Normal rate and regular rhythm.      Pulses: Normal pulses.      Heart sounds: No murmur heard.    No friction rub. No gallop.   Pulmonary:      Effort: Pulmonary effort is normal. No respiratory distress.      Breath sounds: Normal breath sounds. No wheezing, rhonchi or rales.   Abdominal:      General: Bowel sounds are normal. There is no distension.      Palpations: Abdomen is soft.      Tenderness: There is no abdominal tenderness.   Musculoskeletal:         General: No swelling or tenderness. Normal range of motion.      Cervical back: Normal range of motion and neck supple. No muscular tenderness.      Right lower leg: No edema.      Left lower leg: No edema.   Skin:     General: Skin is warm and dry.      Capillary Refill: Capillary refill takes less than 2 seconds.      Findings: No bruising, erythema or rash.   Neurological:      General: No focal deficit present.      Mental Status: He is alert and oriented to person, place, and time.         Fluids    Intake/Output Summary (Last 24 hours) at 3/25/2022 1034  Last data filed at 3/24/2022 2030  Gross per 24 hour   Intake 1517.19 ml   Output --   Net 1517.19 ml       Laboratory  Recent Labs     03/25/22  0129   WBC 8.5   RBC 5.43   HEMOGLOBIN 14.4   HEMATOCRIT 43.8   MCV 80.7*   MCH 26.5*   MCHC 32.9*   RDW 37.8   PLATELETCT 200   MPV 10.1     Recent Labs     03/25/22  0129   SODIUM 137   POTASSIUM 3.9   CHLORIDE 105   CO2 21   GLUCOSE 195*   BUN 10   CREATININE 0.68   CALCIUM 8.3*     Recent Labs     03/23/22  2110 03/23/22  2242 03/25/22  0129   APTT 55.5* 34.8 106.3*   INR 1.07 1.00 1.12         Recent Labs     03/24/22  0406   TRIGLYCERIDE 94   HDL 40   LDL 45       Imaging  EC-ECHOCARDIOGRAM COMPLETE W/O CONT   Final Result      NM-CARDIAC STRESS TEST   Final Result      EC-ECHOCARDIOGRAM COMPLETE W/ CONT     (Results Pending)   CL-LEFT HEART CATHETERIZATION WITH POSSIBLE INTERVENTION    (Results Pending)        Assessment/Plan  * Abnormal stress test  Assessment & Plan  EKG noted 1 mm ST elevation throughout V2 through V6.      Cardiology was consulted who recommended transfer to our facility.  Patient started on a heparin drip. Troponin of 18, 19. UDS negative.  Stress test positive noted apical infarct with melecio-infarct ischemia localized inferiorly, global hypokinesis, LVEF of 35%, with diffuse borderline ST elevation. ECHO pending    Patient started on aspirin, ACE, beta-blocker and statin therapy  Patient is for cardiac catheterization on 03/25.    Obesity (BMI 35.0-39.9 without comorbidity)  Assessment & Plan  Encourage diet exercise and weight loss    Dyslipidemia  Assessment & Plan  Continue with statin therapy  LDL at goal    Uncontrolled type 2 diabetes mellitus with hyperglycemia (HCC)  Assessment & Plan  Patient with hemoglobin A1c of 7.6, he reports when he was initially diagnosed his hemoglobin A1c was 11  Upon discharge she will resume his Metformin and glyburide and nocturnal Lantus  While in house we will continue with Lantus and insulin sliding scale, hypoglycemia protocol in place  Carb consistent/cardiac diet    Abnormal EKG- (present on admission)  Assessment & Plan  EKG noted 1 mm ST elevation throughout V2 through V6.  Cardiology was consulted who recommended transfer to our facility.       VTE prophylaxis: SCDs/TEDs and therapeutic anticoagulation with Heparin Drip    I have performed a physical exam and reviewed and updated ROS and Plan today (3/25/2022). In review of yesterday's note (3/24/2022), there are no changes except as documented above.

## 2022-03-25 NOTE — PROGRESS NOTES
Hospital Medicine Daily Progress Note    Date of Service  3/24/2022    Chief Complaint  Makayla Hays is a 61 y.o. male admitted 3/23/2022 with shortness of breath    Hospital Course  This is a 61 year old male with PMHx of hyperlipidemia, type 2 diabetes with A1c of 7.6, and obesity who was transferred from an outside facility on 03/23/2022 with exertional dyspnea and an abnormal EKG noted at PCP's office.    EKG noted 1 mm ST elevation throughout V2 through V6.  Cardiology was consulted who recommended transfer to our facility.  Patient started on a heparin drip. Troponin of 18, 19.  Stress test positive noted apical infarct with melecio-infarct ischemia localized inferiorly, global hypokinesis, LVEF of 35%, with diffuse borderline ST elevation. ECHO pending. UDS negative.    Cardiology consulted, patient is for cardiac catheterization on 03/25.    Interval Problem Update  Patient reports he initially felt some shortness of breath [place], this prompted him to see his PCP.  EKG was performed in the office and noted slight ST elevation throughout V2 to V6.    Patient was transferred from outside facility, had a positive stress test here, cardiology was consulted, he is for cardiac catheterization tomorrow.    Wife and sister-in-law were at bedside, updated on plan of care, all questions answered.    I have personally seen and examined the patient at bedside. I discussed the plan of care with patient, family, bedside RN, charge RN,  and pharmacy. Cardiology    Consultants/Specialty  cardiology    Code Status  Full Code    Disposition  Patient is not medically cleared for discharge.   Anticipate discharge to to home with close outpatient follow-up.  I have placed the appropriate orders for post-discharge needs.    Review of Systems  Review of Systems   Respiratory: Positive for shortness of breath.    All other systems reviewed and are negative.       Physical Exam  Temp:  [36.2 °C  (97.2 °F)-36.9 °C (98.4 °F)] 36.9 °C (98.4 °F)  Pulse:  [72-92] 79  Resp:  [16-20] 16  BP: (106-129)/(66-83) 106/72  SpO2:  [91 %-93 %] 91 %    Physical Exam  Vitals and nursing note reviewed.   Constitutional:       General: He is not in acute distress.     Appearance: Normal appearance. He is obese.   HENT:      Head: Normocephalic and atraumatic.   Eyes:      Extraocular Movements: Extraocular movements intact.      Conjunctiva/sclera: Conjunctivae normal.      Pupils: Pupils are equal, round, and reactive to light.   Cardiovascular:      Rate and Rhythm: Normal rate and regular rhythm.      Pulses: Normal pulses.      Heart sounds: No murmur heard.    No friction rub. No gallop.   Pulmonary:      Effort: Pulmonary effort is normal. No respiratory distress.      Breath sounds: Normal breath sounds. No wheezing, rhonchi or rales.   Abdominal:      General: Bowel sounds are normal. There is no distension.      Palpations: Abdomen is soft.      Tenderness: There is no abdominal tenderness.   Musculoskeletal:         General: No swelling or tenderness. Normal range of motion.      Cervical back: Normal range of motion and neck supple. No muscular tenderness.      Right lower leg: No edema.      Left lower leg: No edema.   Skin:     General: Skin is warm and dry.      Capillary Refill: Capillary refill takes less than 2 seconds.      Findings: No bruising, erythema or rash.   Neurological:      General: No focal deficit present.      Mental Status: He is alert and oriented to person, place, and time.         Fluids    Intake/Output Summary (Last 24 hours) at 3/24/2022 1728  Last data filed at 3/24/2022 1336  Gross per 24 hour   Intake 982.12 ml   Output 240 ml   Net 742.12 ml       Laboratory          Recent Labs     03/23/22  2110 03/23/22  2242   APTT 55.5* 34.8   INR 1.07 1.00         Recent Labs     03/24/22  0406   TRIGLYCERIDE 94   HDL 40   LDL 45       Imaging  EC-ECHOCARDIOGRAM COMPLETE W/O CONT          NM-CARDIAC STRESS TEST   Final Result      EC-ECHOCARDIOGRAM COMPLETE W/ CONT    (Results Pending)        Assessment/Plan  * Abnormal EKG- (present on admission)  Assessment & Plan  EKG noted 1 mm ST elevation throughout V2 through V6.  Cardiology was consulted who recommended transfer to our facility.    Obesity (BMI 35.0-39.9 without comorbidity)  Assessment & Plan  Encourage diet exercise and weight loss    Abnormal stress test  Assessment & Plan  EKG noted 1 mm ST elevation throughout V2 through V6.      Cardiology was consulted who recommended transfer to our facility.  Patient started on a heparin drip. Troponin of 18, 19. UDS negative.  Stress test positive noted apical infarct with melecio-infarct ischemia localized inferiorly, global hypokinesis, LVEF of 35%, with diffuse borderline ST elevation. ECHO pending    Patient started on aspirin, ACE, beta-blocker and statin therapy  Patient is for cardiac catheterization on 03/25.    Dyslipidemia  Assessment & Plan  Continue with statin therapy  LDL at goal    Uncontrolled type 2 diabetes mellitus with hyperglycemia (HCC)  Assessment & Plan  Patient with hemoglobin A1c of 7.6, he reports when he was initially diagnosed his hemoglobin A1c was 11  Upon discharge she will resume his Metformin and glyburide and nocturnal Lantus  While in house we will continue with Lantus and insulin sliding scale, hypoglycemia protocol in place  Carb consistent/cardiac diet       VTE prophylaxis: SCDs/TEDs and therapeutic anticoagulation with Heparin Drip    I have performed a physical exam and reviewed and updated ROS and Plan today (3/24/2022). In review of yesterday's note (3/23/2022), there are no changes except as documented above.

## 2022-03-25 NOTE — PROGRESS NOTES
12 hour chart check    Telemetry Shift Summary     RHYTHM: SR  HR RANGE: 70-88  ECTOPY: no ectopy  MEASUREMENTS: 0.16/0.06/0.38

## 2022-03-25 NOTE — DOCUMENTATION QUERY
"                                                                         [facility]                                                                       Query Response Note      PATIENT:               MASHA COREAS  ACCT #:                  [id number]  MRN:                     [id number]  :                      1960  ADMIT DATE:       3/23/2022 8:40 PM  DISCH DATE:          RESPONDING  PROVIDER #:        [id number]           QUERY TEXT:    Please clarify the clinical relevance for the following diagnostic finding from the cardiac stress test performed on 3/24: Apical infarct with melecio-infarct ischemia.    The patient's clinical indicators include:  \"Perfusion: Apical infarct with melecio-infarct ischemia particularly inferiorly\" is documented in the Cardiac Stress Test on 3/24. U/S Cardiac ECHO on 3/24 noted moderately reduced LVSF, hypokinesis to akinesis in the mid-anterior wall and entire apex, diastolic dysfunction, and an LVEF of 30-35%. \"EKG noted 1 mm ST elevation throughout V2 through V6.  Cardiology was consulted who recommended transfer to our facility\" is documented in the Progress Notes on 3/24.    Treatments include: ASA, Heparin Gtt, Metoprolol, and Interventional Cardiology Consult.    Risk factors include: dx Chest Pain, dx Cardiomyopathy, hx DM II, and hx HLD.    Thank you,  Santos Buckley RN, BSN  Clinical   Connect via [employer]  Options provided:   -- Pt found to have STEMI   -- Other explanation of findings, Please specify   -- Unable to determine      Query created by: Santos Buckley on 3/25/2022 9:15 AM    RESPONSE TEXT:    Unable to determine          Electronically signed by:  ALEX MANCIA MD 3/25/2022 9:17 AM              "

## 2022-03-25 NOTE — ASSESSMENT & PLAN NOTE
EKG noted 1 mm ST elevation throughout V2 through V6.      Cardiology was consulted who recommended transfer to our facility.  Patient started on a heparin drip. Troponin of 18, 19. UDS negative.  Stress test positive noted apical infarct with melecio-infarct ischemia localized inferiorly, global hypokinesis, LVEF of 35%, with diffuse borderline ST elevation. ECHO pending    Patient started on aspirin, ACE, beta-blocker and statin therapy  Cardiology consulted, patient is s/p [facility] 3/25 with findings of severe three-vessel disease, with recommendations for CABG evaluation.  CTS with recommendations for PCI placement - will occur 3/27/2022

## 2022-03-26 PROBLEM — I42.0 DILATED CARDIOMYOPATHY (HCC): Status: ACTIVE | Noted: 2022-03-26

## 2022-03-26 PROBLEM — I50.21 ACUTE SYSTOLIC HEART FAILURE (HCC): Status: ACTIVE | Noted: 2022-03-26

## 2022-03-26 PROBLEM — I25.3 ANEURYSM OF LEFT VENTRICLE OF HEART: Status: ACTIVE | Noted: 2022-03-26

## 2022-03-26 LAB
ANION GAP SERPL CALC-SCNC: 8 MMOL/L (ref 7–16)
BUN SERPL-MCNC: 9 MG/DL (ref 8–22)
CALCIUM SERPL-MCNC: 8.3 MG/DL (ref 8.5–10.5)
CHLORIDE SERPL-SCNC: 107 MMOL/L (ref 96–112)
CO2 SERPL-SCNC: 21 MMOL/L (ref 20–33)
CREAT SERPL-MCNC: 0.59 MG/DL (ref 0.5–1.4)
GFR SERPLBLD CREATININE-BSD FMLA CKD-EPI: 111 ML/MIN/1.73 M 2
GLUCOSE BLD STRIP.AUTO-MCNC: 121 MG/DL (ref 65–99)
GLUCOSE BLD STRIP.AUTO-MCNC: 142 MG/DL (ref 65–99)
GLUCOSE BLD STRIP.AUTO-MCNC: 155 MG/DL (ref 65–99)
GLUCOSE SERPL-MCNC: 105 MG/DL (ref 65–99)
MAGNESIUM SERPL-MCNC: 2 MG/DL (ref 1.5–2.5)
POTASSIUM SERPL-SCNC: 3.7 MMOL/L (ref 3.6–5.5)
SODIUM SERPL-SCNC: 136 MMOL/L (ref 135–145)
UFH PPP CHRO-ACNC: 0.37 IU/ML
UFH PPP CHRO-ACNC: 0.38 IU/ML

## 2022-03-26 PROCEDURE — 36415 COLL VENOUS BLD VENIPUNCTURE: CPT

## 2022-03-26 PROCEDURE — 700102 HCHG RX REV CODE 250 W/ 637 OVERRIDE(OP): Performed by: INTERNAL MEDICINE

## 2022-03-26 PROCEDURE — 700105 HCHG RX REV CODE 258: Performed by: INTERNAL MEDICINE

## 2022-03-26 PROCEDURE — 700111 HCHG RX REV CODE 636 W/ 250 OVERRIDE (IP): Performed by: INTERNAL MEDICINE

## 2022-03-26 PROCEDURE — 700102 HCHG RX REV CODE 250 W/ 637 OVERRIDE(OP): Performed by: GENERAL PRACTICE

## 2022-03-26 PROCEDURE — 83735 ASSAY OF MAGNESIUM: CPT

## 2022-03-26 PROCEDURE — 99233 SBSQ HOSP IP/OBS HIGH 50: CPT | Performed by: GENERAL PRACTICE

## 2022-03-26 PROCEDURE — 80048 BASIC METABOLIC PNL TOTAL CA: CPT

## 2022-03-26 PROCEDURE — A9270 NON-COVERED ITEM OR SERVICE: HCPCS | Performed by: INTERNAL MEDICINE

## 2022-03-26 PROCEDURE — 82962 GLUCOSE BLOOD TEST: CPT | Mod: 91

## 2022-03-26 PROCEDURE — 99255 IP/OBS CONSLTJ NEW/EST HI 80: CPT | Performed by: THORACIC SURGERY (CARDIOTHORACIC VASCULAR SURGERY)

## 2022-03-26 PROCEDURE — A9270 NON-COVERED ITEM OR SERVICE: HCPCS | Performed by: GENERAL PRACTICE

## 2022-03-26 PROCEDURE — 85520 HEPARIN ASSAY: CPT | Mod: 91

## 2022-03-26 PROCEDURE — 770020 HCHG ROOM/CARE - TELE (206)

## 2022-03-26 RX ORDER — CARVEDILOL 6.25 MG/1
6.25 TABLET ORAL 2 TIMES DAILY WITH MEALS
Status: DISCONTINUED | OUTPATIENT
Start: 2022-03-26 | End: 2022-03-28 | Stop reason: HOSPADM

## 2022-03-26 RX ORDER — POTASSIUM CHLORIDE 20 MEQ/1
40 TABLET, EXTENDED RELEASE ORAL ONCE
Status: COMPLETED | OUTPATIENT
Start: 2022-03-26 | End: 2022-03-26

## 2022-03-26 RX ADMIN — LISINOPRIL 5 MG: 5 TABLET ORAL at 05:41

## 2022-03-26 RX ADMIN — ASPIRIN 81 MG: 81 TABLET, COATED ORAL at 05:41

## 2022-03-26 RX ADMIN — INSULIN HUMAN 1 UNITS: 100 INJECTION, SOLUTION PARENTERAL at 11:53

## 2022-03-26 RX ADMIN — HEPARIN SODIUM 20 UNITS/KG/HR: 5000 INJECTION, SOLUTION INTRAVENOUS at 13:20

## 2022-03-26 RX ADMIN — METOPROLOL TARTRATE 25 MG: 25 TABLET, FILM COATED ORAL at 05:41

## 2022-03-26 RX ADMIN — CARVEDILOL 6.25 MG: 6.25 TABLET, FILM COATED ORAL at 16:34

## 2022-03-26 RX ADMIN — ATORVASTATIN CALCIUM 40 MG: 40 TABLET, FILM COATED ORAL at 16:34

## 2022-03-26 RX ADMIN — POTASSIUM CHLORIDE 40 MEQ: 1500 TABLET, EXTENDED RELEASE ORAL at 08:13

## 2022-03-26 RX ADMIN — SODIUM CHLORIDE: 9 INJECTION, SOLUTION INTRAVENOUS at 05:37

## 2022-03-26 ASSESSMENT — ENCOUNTER SYMPTOMS
NEUROLOGICAL NEGATIVE: 1
RESPIRATORY NEGATIVE: 1
CARDIOVASCULAR NEGATIVE: 1
EYES NEGATIVE: 1
MUSCULOSKELETAL NEGATIVE: 1
CONSTITUTIONAL NEGATIVE: 1
GASTROINTESTINAL NEGATIVE: 1
PSYCHIATRIC NEGATIVE: 1

## 2022-03-26 ASSESSMENT — FIBROSIS 4 INDEX: FIB4 SCORE: 1.28

## 2022-03-26 NOTE — PROCEDURES
Cardiac Catheterization Laboratory Procedure Note    DATE: 3/25/2022    : Wayne Manuel MD, MPH    PROCEDURES PERFORMED:  1. Coronary angiography  2. Moderate conscious sedation    INDICATIONS:  Abnormal EKG with positive stress test.  Reduced LVEF with wall motion abnormalities     CONSENT:  The complete alternatives, risks, and benefits of the procedure were explained to the patient. Signed informed consent was obtained and placed in the chart prior to the procedure.    MEDICATIONS:  1. Lidocaine  2. Fentanyl  3. Midazolam  4. Nitroglycerin  5. Verapamil  6. Heparin    MODERATE CONSCIOUS SEDATION:  I personally supervised the administration of moderate conscious sedation by the nursing staff for 15 minutes.  Start time: 1645  End time: 1700    CONTRAST: Omnipaque 50 cc    RADIATION DOSE (Air Kerma): 380 mGy    FLUOROSCOPY TIME: 1.5 minutes    ACCESS:  1. 6-Azerbaijani Glidesheath in the right radial artery    ESTIMATED BLOOD LOSS: <10 cc    COMPLICATIONS: None    PROCEDURE IN DETAIL:  The patient was brought to the cardiac catheterization laboratory in the fasting state. The skin over the right wrist was prepped and draped in the usual sterile fashion. Lidocaine infiltration was used to anesthetize the tissue over the right radial artery. Using the micropuncture technique, a 6-Azerbaijani Glidesheath was inserted in the right radial artery. A 5 Azerbaijani BandApper diagnostic catheter was then advanced over a standard J-wire into the aortic root where it was gently aspirated and flushed. This catheter was then used to engage the ostium of the left coronary artery and cineangiograms were obtained in multiple projections for complete evaluation of the left coronary system. This catheter was then used to engage the ostium of the right coronary artery and cineangiograms were obtained in multiple projections for complete evaluation of the right coronary system. Following completion of coronary angiography, all wires,  catheters, and sheaths were removed.  A TR band was placed over the right wrist using the patent hemostasis technique.     HEMODYNAMICS:  1. Aortic pressure: 92/63/76  mmHg    CORONARY ANGIOGRAPHY:  1. The left main coronary artery: Well-appearing, trifurcates to an LAD, ramus intermedius and circumflex.  2. Ramus intermedius coronary artery: Severe 95 to 99% proximal stenosis.  3. The left anterior descending coronary artery: Severe 99% stenosis in the mid LAD right at the takeoff of the diagonal branch. Severe 95-99% stenosis in the ostial and mid diagonal branch.  4. The left circumflex coronary artery: Normal appearing except for 100% occlusion of one of the branches of a large OM artery.  The true circumflex tapers into the AV groove  5. The right coronary artery: Dominant, mild 30% proximal stenosis, severe 95-99% diffuse disease in the PL branches.    IMPRESSION:  1.  Severe three-vessel disease including proximal-mid LAD /mid diagonal, proximal ramus intermedius artery and large in caliber obtuse marginal artery.  Diffuse severe disease in the PL branches.  2.  High-normal resting LVEDP with no significant transaortic gradient on pullback.    RECOMMENDATIONS:  1. Appreciate the surgical consultation for CABG evaluation.   2. Resume heparin drip pending revascularization strategy.    NOTIFICATION:  The patient's family (wife and sister-in-law) were notified of the results in-person.    Referring consulting cardiology team notified.    Wayne Manuel MD, MPH Located within [facility]  Interventional Cardiologist  [facility]

## 2022-03-26 NOTE — PROGRESS NOTES
[facility]OLL with cathlabRN. Received report from cathlab RN, Pt assessed, A&Ox4, postprocedure vitals initiated, vitals stable, pt denies pain. no SOB Noted.Right radial TR band in place, 12 ml of air in band per RN report. Site clean, dry and soft. CMT WDL.  Pt instructed to limit right wrist movement and RLE movement. Pt updated on plan of care, Call light within reach, personal belongings within reach.  Bed in lowest position.  Tele monitor on and monitor room notified, rhythm is SR 77. Will continue to monitor. Hourly rounding in place.

## 2022-03-26 NOTE — PROGRESS NOTES
Monitor summary:        Rhythm: SR   Rate: 64-73  Ectopy: (r)PVC  Measurements: 14./.03/.37        12hr chart check

## 2022-03-26 NOTE — CARE PLAN
The patient is Stable - Low risk of patient condition declining or worsening    Shift Goals  Clinical Goals: hemodynamic stability  Patient Goals: shower  Family Goals: ISIS. No family present.    Progress made toward(s) clinical / shift goals:  hemodynamic stability    Patient is not progressing towards the following goals:      Problem: Knowledge Deficit - Standard  Goal: Patient and family/care givers will demonstrate understanding of plan of care, disease process/condition, diagnostic tests and medications  Outcome: Progressing     Problem: Psychosocial  Goal: Patient's level of anxiety will decrease  Outcome: Progressing     Problem: Hemodynamics  Goal: Patient's hemodynamics, fluid balance and neurologic status will be stable or improve  Outcome: Progressing

## 2022-03-26 NOTE — CARE PLAN
The patient is Stable - Low risk of patient condition declining or worsening    Shift Goals  Clinical Goals: hemodybamic stability  Patient Goals: rest, comfort  Family Goals: ISIS. No family present.    Progress made toward(s) clinical / shift goals:     Patient is not progressing towards the following goals:    Problem: Knowledge Deficit - Standard  Goal: Patient and family/care givers will demonstrate understanding of plan of care, disease process/condition, diagnostic tests and medications  Outcome: Progressing     Problem: Psychosocial  Goal: Patient's level of anxiety will decrease  Outcome: Progressing

## 2022-03-26 NOTE — CONSULTS
REFERRING PHYSICIAN: Quinn Blackmon MD.    CONSULTING PHYSICIAN: Maddy Louis MD, FACS.    CHIEF COMPLAINT: Fatigue.    HISTORY OF PRESENT ILLNESS: The patient is a 61 y.o. male with history significant for hyperlipidemia, type II diabetes, diverticulosis and obesity who presented to his PCP in [place] regarding two months of exertional fatigue.  An EKG was abnormal and he was referred to [facility].  His Troponin was normal.  He was transferred to [facility] for further cardiac work up.  Echocardiogram showed moderately reduced EF (30-35%) He underwent cardiac catheterization.    PAST MEDICAL HISTORY:   Past Medical History:   Diagnosis Date   • Cataract    • Diabetes (HCC)    • Indigestion        PAST SURGICAL HISTORY:   History reviewed. No pertinent surgical history.    FAMILY HISTORY:   No family history significant for early CAD.     SOCIAL HISTORY:   Social History     Socioeconomic History   • Marital status:      Spouse name: Not on file   • Number of children: Not on file   • Years of education: Not on file   • Highest education level: Not on file   Occupational History   • Not on file   Tobacco Use   • Smoking status: Never Smoker   • Smokeless tobacco: Never Used   Vaping Use   • Vaping Use: Never used   Substance and Sexual Activity   • Alcohol use: Not on file   • Drug use: Never   • Sexual activity: Not on file   Other Topics Concern   • Not on file   Social History Narrative   • Not on file     Social Determinants of Health     Financial Resource Strain: Not on file   Food Insecurity: Not on file   Transportation Needs: Not on file   Physical Activity: Not on file   Stress: Not on file   Social Connections: Not on file   Intimate Partner Violence: Not on file   Housing Stability: Not on file       ALLERGIES:   No Known Allergies     CURRENT MEDICATIONS:     Current Facility-Administered Medications:   •  atorvastatin (LIPITOR) tablet 40 mg, 40 mg, Oral, Q EVENING, Tasha Diamond  D.O., 40 mg at 03/25/22 1736  •  lisinopril (PRINIVIL) tablet 5 mg, 5 mg, Oral, Q DAY, Tasha Diamond D.O., 5 mg at 03/25/22 0512  •  influenza vaccine quad (FLUZONE/FLUARIX) injection 0.5 mL, 0.5 mL, Intramuscular, Once PRN, Tasha Diamond D.O.  •  NS infusion, , Intravenous, Continuous, Quinn Blackmon M.D., Last Rate: 100 mL/hr at 03/25/22 1428, New Bag at 03/25/22 1428  •  lidocaine (XYLOCAINE) 1 % injection 0.5 mL, 0.5 mL, Intradermal, Once PRN, Quinn Blackmon M.D.  •  insulin regular (HumuLIN R,NovoLIN R) injection, 1-6 Units, Subcutaneous, 4X/DAY ACHS, 1 Units at 03/24/22 2015 **AND** POC blood glucose manual result, , , Q AC AND BEDTIME(S) **AND** NOTIFY MD and PharmD, , , Once **AND** Administer 20 grams of glucose (approximately 8 ounces of fruit juice) every 15 minutes PRN FSBG less than 70 mg/dL, , , PRN **AND** dextrose 10 % BOLUS 25 g, 25 g, Intravenous, Q15 MIN PRN, Tasha Diamond D.O.  •  aspirin EC (ECOTRIN) tablet 81 mg, 81 mg, Oral, DAILY, Baldomero Oconnell M.D., 81 mg at 03/25/22 0512  •  metoprolol tartrate (LOPRESSOR) tablet 25 mg, 25 mg, Oral, TWICE DAILY, Baldomero Oconnell M.D., 25 mg at 03/25/22 1736  •  morphine 4 MG/ML injection 2-4 mg, 2-4 mg, Intravenous, Q5 MIN PRN, Baldomero Oconnell M.D.  •  nitroglycerin (NITROSTAT) tablet 0.4 mg, 0.4 mg, Sublingual, Q5 MIN PRN, Baldomero Oconnell M.D.  •  heparin infusion 25,000 units in 500 mL 0.45% NACL, 0-30 Units/kg/hr (Adjusted), Intravenous, Continuous, Baldomero Oconnell M.D., Last Rate: 35.9 mL/hr at 03/25/22 1802, 20 Units/kg/hr at 03/25/22 1802  •  heparin injection 3,600 Units, 40 Units/kg (Adjusted), Intravenous, PRN, Baldomero Oconnell M.D., 3,600 Units at 03/24/22 1336     LABS REVIEWED:  Lab Results   Component Value Date/Time    SODIUM 138 03/25/2022 12:23 PM    POTASSIUM 4.2 03/25/2022 12:23 PM    CHLORIDE 107 03/25/2022 12:23 PM    CO2 21 03/25/2022 12:23 PM    GLUCOSE 151 (H) 03/25/2022 12:23 PM    BUN 7 (L) 03/25/2022 12:23 PM     CREATININE 0.59 03/25/2022 12:23 PM      Lab Results   Component Value Date/Time    PROTHROMBTM 14.1 03/25/2022 01:29 AM    INR 1.12 03/25/2022 01:29 AM      Lab Results   Component Value Date/Time    WBC 7.3 03/25/2022 12:23 PM    RBC 5.51 03/25/2022 12:23 PM    HEMOGLOBIN 14.7 03/25/2022 12:23 PM    HEMATOCRIT 44.4 03/25/2022 12:23 PM    MCV 80.6 (L) 03/25/2022 12:23 PM    MCH 26.7 (L) 03/25/2022 12:23 PM    MCHC 33.1 (L) 03/25/2022 12:23 PM    RDW 37.9 03/25/2022 12:23 PM    PLATELETCT 193 03/25/2022 12:23 PM    MPV 10.2 03/25/2022 12:23 PM        IMAGING REVIEWED AND INTERPRETED:    ECHOCARDIOGRAM: 3/24/2022  Fair quality study, improved with contrast.  The left ventricular ejection fraction is visually estimated to be 30-  35%, moderately reduced.  Hypokinesis to akinesis of the mid anterior wall and entire apex.  Thinning and hypo to akinesis of the basal to mid inferolateral wall.  [place]unden is partially aneurysmal.  No LV thrombus appreciated.  Mild mitral regurgitation.  Normal estimated right atrial pressure, unable to estimate RVSP.    ANGIOGRAM: 3/25/2022  1. The left main coronary artery: Well-appearing, trifurcates to an LAD, ramus intermedius and circumflex.  2. Ramus intermedius coronary artery: Severe 95 to 99% proximal stenosis.  3. The left anterior descending coronary artery: Severe 99% stenosis in the mid LAD right at the takeoff of the diagonal branch. Severe 95-99% stenosis in the ostial and mid diagonal branch.  4. The left circumflex coronary artery: Normal appearing except for 100% occlusion of one of the branches of a large OM artery.  The true circumflex tapers into the AV groove  5. The right coronary artery: Dominant, mild 30% proximal stenosis, severe 95-99% diffuse disease in the PL branches.     IMPRESSION:  1.  Severe three-vessel disease including proximal-mid LAD /mid diagonal, proximal ramus intermedius artery and large in caliber obtuse marginal artery.  Diffuse severe disease in the  "PL branches.  2.  High-normal resting LVEDP with no significant transaortic gradient on pullback.    REVIEW OF SYSTEMS:   Review of Systems   Constitutional: Negative.    HENT: Negative.    Eyes: Negative.    Respiratory: Negative.    Cardiovascular: Negative.    Gastrointestinal: Negative.    Genitourinary: Negative.    Musculoskeletal: Negative.    Skin: Negative.    Neurological: Negative.    Endo/Heme/Allergies: Negative.    Psychiatric/Behavioral: Negative.        PHYSICAL EXAMINATION:  CONSTITUTIONAL:  /72   Pulse 72   Temp 36.7 °C (98.1 °F)   Resp 18   Ht 1.778 m (5' 10\")   Wt 115 kg (252 lb 7.5 oz)   SpO2 93%.     Physical Exam  Constitutional:       General: He is not in acute distress.  HENT:      Head: Normocephalic.   Eyes:      Pupils: Pupils are equal, round, and reactive to light.   Cardiovascular:      Rate and Rhythm: Normal rate and regular rhythm.      Heart sounds:     No gallop.   Pulmonary:      Effort: Pulmonary effort is normal. No respiratory distress.      Breath sounds: Normal breath sounds. No wheezing or rales.   Abdominal:      General: Bowel sounds are normal. There is no distension.      Palpations: Abdomen is soft.      Tenderness: There is no abdominal tenderness.   Musculoskeletal:         General: Normal range of motion.      Cervical back: Neck supple.   Skin:     General: Skin is warm and dry.   Neurological:      Mental Status: He is alert and oriented to person, place, and time.   Psychiatric:         Mood and Affect: Mood and affect normal.         Cognition and Memory: Memory normal.         Judgment: Judgment normal.       IMPRESSION:  Exertional fatigue, coronary artery disease, left ventricular apical aneurysm, dilated ischemic cardiomyopathy, type 2 diabetes mellitus, obesity.    PLAN:  The patient could be a candidate for coronary artery bypass grafting, however, his left ventricular apical aneurysm would need further work-up such as cardiac MR and/or YESI.  " It may be preferable to have the patient undergo percutaneous coronary intervention.  This was discussed with Quinn Blackmon MD who is in agreement.  The procedure, its risks, benefits, potential complications and alternative treatments were discussed with the patient in detail.    Findings and recommendations have been discussed with the patient’s cardiologist, Quinn Blackmon MD.  Thank you for this challenging consultation and participation in the patient’s care.  I will keep you apprised of all future developments.    Sincerely,    Maddy Louis MD, FACS.      I,  Maddy Louis MD, FACS, performed a substantial portion of the EM visit face-to-face with the same patient on the same date of service with, Tiffanie Arora PA-C. I was personally involved in reviewing and interpreting the films and conducted elements of the history and physical exam. I performed all of the medical decision making for the patient.

## 2022-03-26 NOTE — CONSULTS
"Interval History:  64-year-old male with hyperlipidemia and diabetes presents with dyspnea on exertion and normal troponins and stress test positive for apical infarct with melecio-infarct ischemia.  New ischemic cardiomyopathy noted.  3/26: Multivessel coronary artery disease noted yesterday. Referred for CABG    Physical Exam   Blood pressure 113/71, pulse 71, temperature 36.6 °C (97.8 °F), temperature source Temporal, resp. rate 17, height 1.778 m (5' 10\"), weight 115 kg (252 lb 7.5 oz), SpO2 95 %.    Constitutional:  Appears well-developed.   HENT: Normocephalic and atraumatic. No scleral icterus.   Neck: No JVD present.   Cardiovascular: Normal rate. Exam reveals no gallop and no friction rub. No murmur heard.   Pulmonary/Chest: CTAB   Abdominal: S/NT/ND BS+   Musculoskeletal: Exhibits no edema. Pulses present.  Skin: Skin is warm and dry.     ROS: As HPI other reviewed and negative       Intake/Output Summary (Last 24 hours) at 3/26/2022 1123  Last data filed at 3/26/2022 0800  Gross per 24 hour   Intake 240 ml   Output --   Net 240 ml       Recent Labs     03/25/22  0129 03/25/22  1223   WBC 8.5 7.3   RBC 5.43 5.51   HEMOGLOBIN 14.4 14.7   HEMATOCRIT 43.8 44.4   MCV 80.7* 80.6*   MCH 26.5* 26.7*   MCHC 32.9* 33.1*   RDW 37.8 37.9   PLATELETCT 200 193   MPV 10.1 10.2     Recent Labs     03/25/22  0129 03/25/22  1223 03/26/22  0218   SODIUM 137 138 136   POTASSIUM 3.9 4.2 3.7   CHLORIDE 105 107 107   CO2 21 21 21   GLUCOSE 195* 151* 105*   BUN 10 7* 9   CREATININE 0.68 0.59 0.59   CALCIUM 8.3* 8.5 8.3*     Recent Labs     03/23/22  2110 03/23/22  2242 03/25/22  0129   APTT 55.5* 34.8 106.3*   INR 1.07 1.00 1.12             Recent Labs     03/24/22  0406   TRIGLYCERIDE 94   HDL 40   LDL 45       No current facility-administered medications on file prior to encounter.     Current Outpatient Medications on File Prior to Encounter   Medication Sig Dispense Refill   • GLYBURIDE MICRONIZED PO Take 5 mg by mouth 3 times " a day with meals.     • metformin (GLUCOPHAGE) 1000 MG tablet Take 1,000 mg by mouth 2 times a day with meals.     • atorvastatin (LIPITOR) 40 MG Tab Take 40 mg by mouth every evening.         Current Facility-Administered Medications   Medication Dose Frequency Provider Last Rate Last Admin   • atorvastatin (LIPITOR) tablet 40 mg  40 mg Q EVENING Tasha Diamond D.O.   40 mg at 03/25/22 1736   • lisinopril (PRINIVIL) tablet 5 mg  5 mg Q DAY Tasha Diamond D.O.   5 mg at 03/26/22 0541   • influenza vaccine quad (FLUZONE/FLUARIX) injection 0.5 mL  0.5 mL Once PRN ASTRID Coelho.O.       • NS infusion   Continuous Quinn Blackmon M.D. 100 mL/hr at 03/26/22 0537 New Bag at 03/26/22 0537   • insulin regular (HumuLIN R,NovoLIN R) injection  1-6 Units 4X/DAY ACHS Tasha Diamond D.O.   1 Units at 03/24/22 2015    And   • dextrose 10 % BOLUS 25 g  25 g Q15 MIN PRN Tasha Diamond D.O.       • aspirin EC (ECOTRIN) tablet 81 mg  81 mg DAILY Baldomero Oconnell M.D.   81 mg at 03/26/22 0541   • metoprolol tartrate (LOPRESSOR) tablet 25 mg  25 mg TWICE DAILY Baldomero Oconnell M.D.   25 mg at 03/26/22 0541   • morphine 4 MG/ML injection 2-4 mg  2-4 mg Q5 MIN PRN Baldomero Oconnell M.D.       • nitroglycerin (NITROSTAT) tablet 0.4 mg  0.4 mg Q5 MIN PRN Baldomero Oconnell M.D.       • heparin infusion 25,000 units in 500 mL 0.45% NACL  0-30 Units/kg/hr (Adjusted) Continuous Baldomero Oconnell M.D. 35.9 mL/hr at 03/26/22 0704 20 Units/kg/hr at 03/26/22 0704   • heparin injection 3,600 Units  40 Units/kg (Adjusted) PRN Baldomero Oconnell M.D.   3,600 Units at 03/24/22 1336   Last reviewed on 3/23/2022 11:54 PM by Katrin Jacobs R.N.    Medications reviewed    Imaging reviewed    ECHO(3/24/2022):  Fair quality study, improved with contrast.  The left ventricular ejection fraction is visually estimated to be 30-  35%, moderately reduced.  Hypokinesis to akinesis of the mid anterior wall and entire apex.  Thinning and hypo to akinesis of  the basal to mid inferolateral wall.  [place] is partially aneurysmal.  No LV thrombus appreciated.  Mild mitral regurgitation.  Normal estimated right atrial pressure, unable to estimate RVSP.    Impressions:  1.  Acutely decompensated systolic heart failure  2.  Ischemic cardiomyopathy, new diagnosis  3.  Multivessel coronary artery disease  5.  Apical LV aneurysm without thrombus  6.  Diabetes mellitus    Recommendations:  He has been referred for multivessel CABG however there is concern regarding the apical aneurysm and uncertain ability to provide complete revascularization.  He is not in the hospital for acute coronary syndrome but rather for newly decompensated heart failure without current myocardial injury.  In review of his angiographic films he has percutaneous options for revascularization.  It is reasonable to attempt percutaneous revascularization and medical optimization.  If this is met with reduced success or early stent failure then reconsideration for CABG is warranted.  Discussed with Dr. Maddy Barillas from cardiothoracic surgery extensively today.  Discussed extensively with the patient his wife and sister who is a former Cath Lab nurse today.    Recommend DAPT  N.p.o. after midnight for PCI  Optimal therapy for diabetes mellitus  Statin, beta blocker, ACE-I and up-titrate as tolerated.    Discussed with primary physician and bedside nursing.

## 2022-03-26 NOTE — PROGRESS NOTES
Report received from day RN. Pt resting in bed comfortably, VSS. Monitor rhythm & heparin gtt verified. Call-light in reach.

## 2022-03-27 ENCOUNTER — APPOINTMENT (OUTPATIENT)
Dept: CARDIOLOGY | Facility: MEDICAL CENTER | Age: 62
DRG: 246 | End: 2022-03-27
Attending: NURSE PRACTITIONER
Payer: COMMERCIAL

## 2022-03-27 LAB
ANION GAP SERPL CALC-SCNC: 13 MMOL/L (ref 7–16)
BUN SERPL-MCNC: 8 MG/DL (ref 8–22)
CALCIUM SERPL-MCNC: 8.3 MG/DL (ref 8.5–10.5)
CHLORIDE SERPL-SCNC: 105 MMOL/L (ref 96–112)
CO2 SERPL-SCNC: 20 MMOL/L (ref 20–33)
CREAT SERPL-MCNC: 0.6 MG/DL (ref 0.5–1.4)
EKG IMPRESSION: NORMAL
ERYTHROCYTE [DISTWIDTH] IN BLOOD BY AUTOMATED COUNT: 38 FL (ref 35.9–50)
GFR SERPLBLD CREATININE-BSD FMLA CKD-EPI: 110 ML/MIN/1.73 M 2
GLUCOSE BLD STRIP.AUTO-MCNC: 171 MG/DL (ref 65–99)
GLUCOSE SERPL-MCNC: 137 MG/DL (ref 65–99)
HCT VFR BLD AUTO: 43.2 % (ref 42–52)
HGB BLD-MCNC: 14.2 G/DL (ref 14–18)
MAGNESIUM SERPL-MCNC: 2.1 MG/DL (ref 1.5–2.5)
MCH RBC QN AUTO: 26.7 PG (ref 27–33)
MCHC RBC AUTO-ENTMCNC: 32.9 G/DL (ref 33.7–35.3)
MCV RBC AUTO: 81.4 FL (ref 81.4–97.8)
PHOSPHATE SERPL-MCNC: 3 MG/DL (ref 2.5–4.5)
PLATELET # BLD AUTO: 192 K/UL (ref 164–446)
PMV BLD AUTO: 11.5 FL (ref 9–12.9)
POTASSIUM SERPL-SCNC: 4 MMOL/L (ref 3.6–5.5)
RBC # BLD AUTO: 5.31 M/UL (ref 4.7–6.1)
SODIUM SERPL-SCNC: 138 MMOL/L (ref 135–145)
UFH PPP CHRO-ACNC: 0.29 IU/ML
WBC # BLD AUTO: 7.6 K/UL (ref 4.8–10.8)

## 2022-03-27 PROCEDURE — 83735 ASSAY OF MAGNESIUM: CPT

## 2022-03-27 PROCEDURE — 92929 PR PRQ TRLUML CORONARY STENT W/ANGIO ADDL ART/BRNCH: CPT | Performed by: INTERNAL MEDICINE

## 2022-03-27 PROCEDURE — 0271376 DILATION OF CORONARY ARTERY, TWO ARTERIES, BIFURCATION, WITH FOUR OR MORE DRUG-ELUTING INTRALUMINAL DEVICES, PERCUTANEOUS APPROACH: ICD-10-PCS | Performed by: INTERNAL MEDICINE

## 2022-03-27 PROCEDURE — 700102 HCHG RX REV CODE 250 W/ 637 OVERRIDE(OP): Performed by: GENERAL PRACTICE

## 2022-03-27 PROCEDURE — A9270 NON-COVERED ITEM OR SERVICE: HCPCS | Performed by: INTERNAL MEDICINE

## 2022-03-27 PROCEDURE — 80048 BASIC METABOLIC PNL TOTAL CA: CPT

## 2022-03-27 PROCEDURE — 700102 HCHG RX REV CODE 250 W/ 637 OVERRIDE(OP): Performed by: INTERNAL MEDICINE

## 2022-03-27 PROCEDURE — 770020 HCHG ROOM/CARE - TELE (206)

## 2022-03-27 PROCEDURE — 36415 COLL VENOUS BLD VENIPUNCTURE: CPT

## 2022-03-27 PROCEDURE — A9270 NON-COVERED ITEM OR SERVICE: HCPCS | Performed by: GENERAL PRACTICE

## 2022-03-27 PROCEDURE — 700101 HCHG RX REV CODE 250

## 2022-03-27 PROCEDURE — 93452 LEFT HRT CATH W/VENTRCLGRPHY: CPT | Mod: 26 | Performed by: INTERNAL MEDICINE

## 2022-03-27 PROCEDURE — 700117 HCHG RX CONTRAST REV CODE 255: Performed by: INTERNAL MEDICINE

## 2022-03-27 PROCEDURE — 700111 HCHG RX REV CODE 636 W/ 250 OVERRIDE (IP): Performed by: INTERNAL MEDICINE

## 2022-03-27 PROCEDURE — 93452 LEFT HRT CATH W/VENTRCLGRPHY: CPT

## 2022-03-27 PROCEDURE — 85027 COMPLETE CBC AUTOMATED: CPT

## 2022-03-27 PROCEDURE — 700111 HCHG RX REV CODE 636 W/ 250 OVERRIDE (IP)

## 2022-03-27 PROCEDURE — 700105 HCHG RX REV CODE 258: Performed by: INTERNAL MEDICINE

## 2022-03-27 PROCEDURE — 93010 ELECTROCARDIOGRAM REPORT: CPT | Performed by: INTERNAL MEDICINE

## 2022-03-27 PROCEDURE — 84100 ASSAY OF PHOSPHORUS: CPT

## 2022-03-27 PROCEDURE — 82962 GLUCOSE BLOOD TEST: CPT | Mod: 91

## 2022-03-27 PROCEDURE — 99233 SBSQ HOSP IP/OBS HIGH 50: CPT | Performed by: GENERAL PRACTICE

## 2022-03-27 PROCEDURE — A9270 NON-COVERED ITEM OR SERVICE: HCPCS

## 2022-03-27 PROCEDURE — 99152 MOD SED SAME PHYS/QHP 5/>YRS: CPT | Performed by: INTERNAL MEDICINE

## 2022-03-27 PROCEDURE — 99153 MOD SED SAME PHYS/QHP EA: CPT

## 2022-03-27 PROCEDURE — 85520 HEPARIN ASSAY: CPT

## 2022-03-27 PROCEDURE — 93005 ELECTROCARDIOGRAM TRACING: CPT | Performed by: INTERNAL MEDICINE

## 2022-03-27 PROCEDURE — 92928 PRQ TCAT PLMT NTRAC ST 1 LES: CPT | Performed by: INTERNAL MEDICINE

## 2022-03-27 PROCEDURE — 700102 HCHG RX REV CODE 250 W/ 637 OVERRIDE(OP)

## 2022-03-27 PROCEDURE — 4A023N7 MEASUREMENT OF CARDIAC SAMPLING AND PRESSURE, LEFT HEART, PERCUTANEOUS APPROACH: ICD-10-PCS | Performed by: INTERNAL MEDICINE

## 2022-03-27 RX ORDER — PRASUGREL 10 MG/1
TABLET, FILM COATED ORAL
Status: COMPLETED
Start: 2022-03-27 | End: 2022-03-27

## 2022-03-27 RX ORDER — BIVALIRUDIN 250 MG/5ML
INJECTION, POWDER, LYOPHILIZED, FOR SOLUTION INTRAVENOUS
Status: COMPLETED
Start: 2022-03-27 | End: 2022-03-27

## 2022-03-27 RX ORDER — SODIUM CHLORIDE 9 MG/ML
INJECTION, SOLUTION INTRAVENOUS CONTINUOUS
Status: ACTIVE | OUTPATIENT
Start: 2022-03-27 | End: 2022-03-27

## 2022-03-27 RX ORDER — LIDOCAINE HYDROCHLORIDE 20 MG/ML
INJECTION, SOLUTION EPIDURAL; INFILTRATION; INTRACAUDAL; PERINEURAL
Status: COMPLETED
Start: 2022-03-27 | End: 2022-03-27

## 2022-03-27 RX ORDER — HEPARIN SODIUM 200 [USP'U]/100ML
INJECTION, SOLUTION INTRAVENOUS
Status: COMPLETED
Start: 2022-03-27 | End: 2022-03-27

## 2022-03-27 RX ORDER — PRASUGREL 10 MG/1
10 TABLET, FILM COATED ORAL DAILY
Status: DISCONTINUED | OUTPATIENT
Start: 2022-03-28 | End: 2022-03-28 | Stop reason: HOSPADM

## 2022-03-27 RX ORDER — MIDAZOLAM HYDROCHLORIDE 1 MG/ML
INJECTION INTRAMUSCULAR; INTRAVENOUS
Status: COMPLETED
Start: 2022-03-27 | End: 2022-03-27

## 2022-03-27 RX ORDER — VERAPAMIL HYDROCHLORIDE 2.5 MG/ML
INJECTION, SOLUTION INTRAVENOUS
Status: COMPLETED
Start: 2022-03-27 | End: 2022-03-27

## 2022-03-27 RX ORDER — HEPARIN SODIUM 1000 [USP'U]/ML
INJECTION, SOLUTION INTRAVENOUS; SUBCUTANEOUS
Status: COMPLETED
Start: 2022-03-27 | End: 2022-03-27

## 2022-03-27 RX ORDER — PHENYLEPHRINE HCL IN 0.9% NACL 0.5 MG/5ML
SYRINGE (ML) INTRAVENOUS
Status: COMPLETED
Start: 2022-03-27 | End: 2022-03-27

## 2022-03-27 RX ORDER — ACETAMINOPHEN 325 MG/1
650 TABLET ORAL EVERY 6 HOURS PRN
Status: DISCONTINUED | OUTPATIENT
Start: 2022-03-27 | End: 2022-03-28 | Stop reason: HOSPADM

## 2022-03-27 RX ADMIN — INSULIN HUMAN 1 UNITS: 100 INJECTION, SOLUTION PARENTERAL at 17:06

## 2022-03-27 RX ADMIN — BIVALIRUDIN 1.75 MG/KG/HR: 250 INJECTION, POWDER, LYOPHILIZED, FOR SOLUTION INTRAVENOUS at 11:56

## 2022-03-27 RX ADMIN — Medication 100 MCG: at 11:59

## 2022-03-27 RX ADMIN — NITROGLYCERIN 10 ML: 20 INJECTION INTRAVENOUS at 11:13

## 2022-03-27 RX ADMIN — LISINOPRIL 5 MG: 5 TABLET ORAL at 05:38

## 2022-03-27 RX ADMIN — CARVEDILOL 6.25 MG: 6.25 TABLET, FILM COATED ORAL at 08:29

## 2022-03-27 RX ADMIN — FENTANYL CITRATE 50 MCG: 50 INJECTION, SOLUTION INTRAMUSCULAR; INTRAVENOUS at 11:59

## 2022-03-27 RX ADMIN — INSULIN HUMAN 1 UNITS: 100 INJECTION, SOLUTION PARENTERAL at 19:51

## 2022-03-27 RX ADMIN — ATORVASTATIN CALCIUM 40 MG: 40 TABLET, FILM COATED ORAL at 16:45

## 2022-03-27 RX ADMIN — BIVALIRUDIN 1.75 MG/KG/HR: 250 INJECTION, POWDER, LYOPHILIZED, FOR SOLUTION INTRAVENOUS at 11:16

## 2022-03-27 RX ADMIN — VERAPAMIL HYDROCHLORIDE 2.5 MG: 2.5 INJECTION, SOLUTION INTRAVENOUS at 11:14

## 2022-03-27 RX ADMIN — HEPARIN SODIUM 20 UNITS/KG/HR: 5000 INJECTION, SOLUTION INTRAVENOUS at 03:47

## 2022-03-27 RX ADMIN — BIVALIRUDIN 0.2 MG/KG/HR: 250 INJECTION, POWDER, LYOPHILIZED, FOR SOLUTION INTRAVENOUS at 13:00

## 2022-03-27 RX ADMIN — ASPIRIN 81 MG: 81 TABLET, COATED ORAL at 05:38

## 2022-03-27 RX ADMIN — HEPARIN SODIUM: 1000 INJECTION, SOLUTION INTRAVENOUS; SUBCUTANEOUS at 11:12

## 2022-03-27 RX ADMIN — MIDAZOLAM HYDROCHLORIDE 1.5 MG: 1 INJECTION, SOLUTION INTRAMUSCULAR; INTRAVENOUS at 11:58

## 2022-03-27 RX ADMIN — CARVEDILOL 6.25 MG: 6.25 TABLET, FILM COATED ORAL at 16:45

## 2022-03-27 RX ADMIN — SODIUM CHLORIDE: 9 INJECTION, SOLUTION INTRAVENOUS at 13:00

## 2022-03-27 RX ADMIN — HEPARIN SODIUM 2000 UNITS: 200 INJECTION, SOLUTION INTRAVENOUS at 11:11

## 2022-03-27 RX ADMIN — IOHEXOL 157 ML: 350 INJECTION, SOLUTION INTRAVENOUS at 11:58

## 2022-03-27 RX ADMIN — PRASUGREL 60 MG: 10 TABLET, FILM COATED ORAL at 11:14

## 2022-03-27 RX ADMIN — LIDOCAINE HYDROCHLORIDE 10 ML: 20 INJECTION, SOLUTION EPIDURAL; INFILTRATION; INTRACAUDAL; PERINEURAL at 11:13

## 2022-03-27 ASSESSMENT — FIBROSIS 4 INDEX: FIB4 SCORE: 1.288470508005518922

## 2022-03-27 NOTE — CARE PLAN
The patient is Stable - Low risk of patient condition declining or worsening    Shift Goals  Clinical Goals: Prepare for cath; remain hemodynamically stable  Patient Goals: Comfort; rest for tomorrow's procedure  Family Goals: ISIS. No family present.    Progress made toward(s) clinical / shift goals:      Patient is not progressing towards the following goals:      Problem: Knowledge Deficit - Standard  Goal: Patient and family/care givers will demonstrate understanding of plan of care, disease process/condition, diagnostic tests and medications  Outcome: Met     Problem: Psychosocial  Goal: Patient's level of anxiety will decrease  Outcome: Met     Problem: Communication  Goal: The ability to communicate needs accurately and effectively will improve  Outcome: Met     Problem: Self Care  Goal: Patient will have the ability to perform ADLs independently or with assistance (bathe, groom, dress, toilet and feed)  Outcome: Met

## 2022-03-27 NOTE — PROGRESS NOTES
Hospital Medicine Daily Progress Note    Date of Service  3/26/2022    Chief Complaint  Makayla Hays is a 61 y.o. male admitted 3/23/2022 with shortness of breath    Hospital Course  This is a 61 year old male with PMHx of hyperlipidemia, type 2 diabetes with A1c of 7.6, and obesity who was transferred from an outside facility on 03/23/2022 with exertional dyspnea and an abnormal EKG noted at PCP's office.    EKG noted 1 mm ST elevation throughout V2 through V6.  Cardiology was consulted who recommended transfer to our facility.  Patient started on a heparin drip. Troponin of 18, 19.  Stress test positive noted apical infarct with melecio-infarct ischemia localized inferiorly, global hypokinesis, LVEF of 35%, with diffuse borderline ST elevation. ECHO pending. UDS negative.    Cardiology consulted, patient is s/p [facility] 3/25 with findings of severe three-vessel disease, with recommendations for CABG evaluation. CTS evaluated patient and had extensive conversation with cardiology, due to the left ventricular apical aneurysm, he would require further work-up with a cardiac MRI or YESI.  CTS recommended for PCI placement instead.  Patient is for cardiac catheterization on 03/27 for PCI.    Interval Problem Update   Cardiology consulted, patient is s/p [facility] 3/25 with findings of severe three-vessel disease, with recommendations for CABG evaluation.     CTS evaluated patient and had extensive conversation with cardiology, due to the left ventricular apical aneurysm, he would require further work-up with a cardiac MRI or YESI.  CTS recommended for PCI placement instead.  Patient is for cardiac catheterization on 03/27 for PCI.    I have personally seen and examined the patient at bedside. I discussed the plan of care with patient, family, bedside RN, charge RN,  and pharmacy.     Consultants/Specialty  cardiology    Code Status  Full Code    Disposition  Patient is not medically cleared for discharge.   Anticipate  discharge to to home with close outpatient follow-up.  I have placed the appropriate orders for post-discharge needs.    Review of Systems  Review of Systems   Constitutional: Positive for malaise/fatigue.   All other systems reviewed and are negative.       Physical Exam  Temp:  [36.3 °C (97.3 °F)-36.8 °C (98.2 °F)] 36.6 °C (97.8 °F)  Pulse:  [71-80] 80  Resp:  [17-18] 18  BP: (108-120)/(59-80) 120/63  SpO2:  [92 %-96 %] 96 %    Physical Exam  Vitals and nursing note reviewed.   Constitutional:       General: He is not in acute distress.     Appearance: Normal appearance. He is obese.   HENT:      Head: Normocephalic and atraumatic.   Eyes:      Extraocular Movements: Extraocular movements intact.      Conjunctiva/sclera: Conjunctivae normal.      Pupils: Pupils are equal, round, and reactive to light.   Cardiovascular:      Rate and Rhythm: Normal rate and regular rhythm.      Pulses: Normal pulses.      Heart sounds: No murmur heard.    No friction rub. No gallop.   Pulmonary:      Effort: Pulmonary effort is normal. No respiratory distress.      Breath sounds: Normal breath sounds. No wheezing, rhonchi or rales.   Abdominal:      General: Bowel sounds are normal. There is no distension.      Palpations: Abdomen is soft.      Tenderness: There is no abdominal tenderness.   Musculoskeletal:         General: No swelling or tenderness. Normal range of motion.      Cervical back: Normal range of motion and neck supple. No muscular tenderness.      Right lower leg: No edema.      Left lower leg: No edema.   Skin:     General: Skin is warm and dry.      Capillary Refill: Capillary refill takes less than 2 seconds.      Findings: No bruising, erythema or rash.   Neurological:      General: No focal deficit present.      Mental Status: He is alert and oriented to person, place, and time.         Fluids    Intake/Output Summary (Last 24 hours) at 3/26/2022 1851  Last data filed at 3/26/2022 0800  Gross per 24 hour    Intake 240 ml   Output --   Net 240 ml       Laboratory  Recent Labs     03/25/22  0129 03/25/22  1223   WBC 8.5 7.3   RBC 5.43 5.51   HEMOGLOBIN 14.4 14.7   HEMATOCRIT 43.8 44.4   MCV 80.7* 80.6*   MCH 26.5* 26.7*   MCHC 32.9* 33.1*   RDW 37.8 37.9   PLATELETCT 200 193   MPV 10.1 10.2     Recent Labs     03/25/22  0129 03/25/22  1223 03/26/22  0218   SODIUM 137 138 136   POTASSIUM 3.9 4.2 3.7   CHLORIDE 105 107 107   CO2 21 21 21   GLUCOSE 195* 151* 105*   BUN 10 7* 9   CREATININE 0.68 0.59 0.59   CALCIUM 8.3* 8.5 8.3*     Recent Labs     03/23/22  2110 03/23/22  2242 03/25/22  0129   APTT 55.5* 34.8 106.3*   INR 1.07 1.00 1.12         Recent Labs     03/24/22  0406   TRIGLYCERIDE 94   HDL 40   LDL 45       Imaging  EC-ECHOCARDIOGRAM COMPLETE W/O CONT   Final Result      NM-CARDIAC STRESS TEST   Final Result      EC-ECHOCARDIOGRAM COMPLETE W/ CONT    (Results Pending)   CL-LEFT HEART CATHETERIZATION WITH POSSIBLE INTERVENTION    (Results Pending)        Assessment/Plan  * Abnormal stress test  Assessment & Plan  EKG noted 1 mm ST elevation throughout V2 through V6.      Cardiology was consulted who recommended transfer to our facility.  Patient started on a heparin drip. Troponin of 18, 19. UDS negative.  Stress test positive noted apical infarct with melecio-infarct ischemia localized inferiorly, global hypokinesis, LVEF of 35%, with diffuse borderline ST elevation. ECHO pending    Patient started on aspirin, ACE, beta-blocker and statin therapy  Cardiology consulted, patient is s/p [facility] 3/25 with findings of severe three-vessel disease, with recommendations for CABG evaluation.  CTS with recommendations for PCI placement - will occur 3/27/2022    Acute systolic heart failure (HCC)  Assessment & Plan  Patient started on aspirin, beta-blocker, ACE and statin therapy  Patient with triple-vessel disease  Initially evaluated for CABG, now for PCI on 03/27    Aneurysm of left ventricle of heart  Assessment & Plan  Noted  on left heart catheterization    Dilated cardiomyopathy (HCC)  Assessment & Plan  Noted on left heart catheterization    Obesity (BMI 35.0-39.9 without comorbidity)  Assessment & Plan  Encourage diet exercise and weight loss    Dyslipidemia  Assessment & Plan  Continue with statin therapy  LDL at goal    Uncontrolled type 2 diabetes mellitus with hyperglycemia (HCC)  Assessment & Plan  Patient with hemoglobin A1c of 7.6, he reports when he was initially diagnosed his hemoglobin A1c was 11  Upon discharge she will resume his Metformin and glyburide and nocturnal Lantus  While in house we will continue with Lantus and insulin sliding scale, hypoglycemia protocol in place  Carb consistent/cardiac diet    Abnormal EKG- (present on admission)  Assessment & Plan  EKG noted 1 mm ST elevation throughout V2 through V6.  Cardiology was consulted who recommended transfer to our facility.       VTE prophylaxis: SCDs/TEDs and therapeutic anticoagulation with Heparin Drip    I have performed a physical exam and reviewed and updated ROS and Plan today (3/26/2022). In review of yesterday's note (3/25/2022), there are no changes except as documented above.

## 2022-03-27 NOTE — HEART FAILURE PROGRAM
New heart failure with reduced ejection fraction (HFrEF). Return to cath for PCI today as pt was considered to be high risk for CTS.  EF: 35%  Device? Not indicated, new discovery of EF of 35%  Discharge Planning  • Residence: [facility]  • Insurance: [employer]  • Indication on facesheet for Hydralazine Hydrochloride/Isosorbide- Dinitrate? no  • Referral to disease management program specializing in heart failure care- requested that schedulers notify me if patient cannot be scheduled at the Heart Failure [facility]  • Opted in for Meds to Beds? Not addressed      Special Clinical Considerations Pertinent to HF    • Pneumococcal vaccine: missing  • Influenza vaccine: missing  • Tobacco Status: denies per h/p  • Documentation of tobacco cessation counseling: n/a  • DM dx: yes on atorvastatin and insulin  • Atrial arrhythmia dx: no    Nurses: HF has been added as a title to the education tab and to the care plan. Would you please take credit for the great work that you're doing by documenting in these? Thank you!  Providers: below are Guideline Directed Medical Therapy (GDMT) for HFrEF. If any cannot be prescribed by discharge, would you please note the clinical reason for each in your discharge summary? Thanks! Trinh  • Evidence Based Beta Blocker (bisoprolol, carvedilol, or toprol xl), for EF of 40% or less  • AIDEN - I, for EF of 40% or less ARNI is preferred If not cost prohibitive for patient  • SGLT2 inhibitor with proven ASCVD, HF, or DKD benefit Jardiance/empagliflozin): If not cost prohibitive for patient  • Aldosterone antagonist, for EF of 35% or less, if applicable  • Anticoagulation for atrial arrhythmia, if applicable  • Glycemic control for DM + HF, if applicable  • Lipid lowering medication for DM + HF, if applicable  • Hydralazine Hydrochloride/Isosorbide Dinitrate. The combination of hydralazine and isosorbide- dinitrate is recommended to reduce morbidity and mortality for patients  self-described  Americans with NYHA class III-IV HFrEF (EF 40% or less), receiving optimal therapy with ACE inhibitors and beta blockers, unless contraindicated (Class I, DIANA: A).  • Pneumococcal vaccine, if not previously received  • Influenza vaccine for current season, if not previously received  • Smoking cessation counseling documented, if applicable  • Device screening, if applicable  • Referral to disease management program specializing in heart failure care- requested that schedulers notify me if patient cannot be scheduled at the Heart Failure Clinic

## 2022-03-27 NOTE — PROGRESS NOTES
Monitor summary:        Rhythm: SR   Rate: 71-90  Ectopy: (r)PVC  Measurements: 14./.10.50        12hr chart check

## 2022-03-27 NOTE — PROGRESS NOTES
[facility] with cathlabRN. Received report from cathlab RN, Pt assessed, A&Ox4, postprocedure vitals initiated, vitals stable, pt denies pain. no SOB Noted.Right radial TR band in place, 13 ml of air in band per RN report. Site clean, dry and soft. CMT WDL.Pt instructed to limit right wrist movement . Pt updated on plan of care, Call light within reach, personal belongings within reach.  Bed in lowest position.  Tele monitor on and monitor room notified, rhythm is SR 74. Will continue to monitor. Hourly rounding in place.

## 2022-03-27 NOTE — PROGRESS NOTES
Hospital Medicine Daily Progress Note    Date of Service  3/27/2022    Chief Complaint  Makayla Hays is a 61 y.o. male admitted 3/23/2022 with shortness of breath    Hospital Course  This is a 61 year old male with PMHx of hyperlipidemia, type 2 diabetes with A1c of 7.6, and obesity who was transferred from an outside facility on 03/23/2022 with exertional dyspnea and an abnormal EKG noted at PCP's office.    EKG noted 1 mm ST elevation throughout V2 through V6.  Cardiology was consulted who recommended transfer to our facility.  Patient started on a heparin drip. Troponin of 18, 19.  Stress test positive noted apical infarct with melecio-infarct ischemia localized inferiorly, global hypokinesis, LVEF of 35%, with diffuse borderline ST elevation. ECHO pending. UDS negative.    Cardiology consulted, patient is s/p [facility] 3/25 with findings of severe three-vessel disease, with recommendations for CABG evaluation. CTS evaluated patient and had extensive conversation with cardiology, due to the left ventricular apical aneurysm, he would require further work-up with a cardiac MRI or YESI.  CTS recommended for PCI placement instead.  Patient is for cardiac catheterization on 03/27 for PCI.    Interval Problem Update   CTS evaluated patient and had extensive conversation with cardiology, due to the left ventricular apical aneurysm, he would require further work-up with a cardiac MRI or YESI.  CTS recommended for PCI placement instead.  Patient is for cardiac catheterization on today for PCI.    Pending further recommendations or cardiology for clearance for discharge.     I have personally seen and examined the patient at bedside. I discussed the plan of care with patient, family, bedside RN, charge RN,  and pharmacy.     Consultants/Specialty  cardiology    Code Status  Full Code    Disposition  Patient is not medically cleared for discharge.   Anticipate discharge to to home with close outpatient follow-up.  I have  placed the appropriate orders for post-discharge needs.    Review of Systems  Review of Systems   All other systems reviewed and are negative.       Physical Exam  Temp:  [36 °C (96.8 °F)-36.8 °C (98.2 °F)] 36.7 °C (98.1 °F)  Pulse:  [72-82] 72  Resp:  [16-18] 18  BP: (104-126)/(60-71) 125/71  SpO2:  [91 %-96 %] 92 %    Physical Exam  Vitals and nursing note reviewed.   Constitutional:       General: He is not in acute distress.     Appearance: Normal appearance. He is obese.   HENT:      Head: Normocephalic and atraumatic.   Eyes:      Extraocular Movements: Extraocular movements intact.      Conjunctiva/sclera: Conjunctivae normal.      Pupils: Pupils are equal, round, and reactive to light.   Cardiovascular:      Rate and Rhythm: Normal rate and regular rhythm.      Pulses: Normal pulses.      Heart sounds: No murmur heard.    No friction rub. No gallop.   Pulmonary:      Effort: Pulmonary effort is normal. No respiratory distress.      Breath sounds: Normal breath sounds. No wheezing, rhonchi or rales.   Abdominal:      General: Bowel sounds are normal. There is no distension.      Palpations: Abdomen is soft.      Tenderness: There is no abdominal tenderness.   Musculoskeletal:         General: No swelling or tenderness. Normal range of motion.      Cervical back: Normal range of motion and neck supple. No muscular tenderness.      Right lower leg: No edema.      Left lower leg: No edema.   Skin:     General: Skin is warm and dry.      Capillary Refill: Capillary refill takes less than 2 seconds.      Findings: No bruising, erythema or rash.   Neurological:      General: No focal deficit present.      Mental Status: He is alert and oriented to person, place, and time.         Fluids  No intake or output data in the 24 hours ending 03/27/22 1119    Laboratory  Recent Labs     03/25/22  0129 03/25/22  1223 03/27/22  0654   WBC 8.5 7.3 7.6   RBC 5.43 5.51 5.31   HEMOGLOBIN 14.4 14.7 14.2   HEMATOCRIT 43.8 44.4  43.2   MCV 80.7* 80.6* 81.4   MCH 26.5* 26.7* 26.7*   MCHC 32.9* 33.1* 32.9*   RDW 37.8 37.9 38.0   PLATELETCT 200 193 192   MPV 10.1 10.2 11.5     Recent Labs     03/25/22  1223 03/26/22  0218 03/27/22  0654   SODIUM 138 136 138   POTASSIUM 4.2 3.7 4.0   CHLORIDE 107 107 105   CO2 21 21 20   GLUCOSE 151* 105* 137*   BUN 7* 9 8   CREATININE 0.59 0.59 0.60   CALCIUM 8.5 8.3* 8.3*     Recent Labs     03/25/22  0129   APTT 106.3*   INR 1.12               Imaging  EC-ECHOCARDIOGRAM COMPLETE W/O CONT   Final Result      NM-CARDIAC STRESS TEST   Final Result      EC-ECHOCARDIOGRAM COMPLETE W/ CONT    (Results Pending)   CL-LEFT HEART CATHETERIZATION WITH POSSIBLE INTERVENTION    (Results Pending)   CL-LEFT HEART CATHETERIZATION WITH POSSIBLE INTERVENTION    (Results Pending)        Assessment/Plan  * Abnormal stress test  Assessment & Plan  EKG noted 1 mm ST elevation throughout V2 through V6.      Cardiology was consulted who recommended transfer to our facility.  Patient started on a heparin drip. Troponin of 18, 19. UDS negative.  Stress test positive noted apical infarct with melecio-infarct ischemia localized inferiorly, global hypokinesis, LVEF of 35%, with diffuse borderline ST elevation. ECHO pending    Patient started on aspirin, ACE, beta-blocker and statin therapy  Cardiology consulted, patient is s/p [facility] 3/[facility] with findings of severe three-vessel disease, with recommendations for CABG evaluation.  CTS with recommendations for PCI placement - will occur 3/27/2022    Acute systolic heart failure (HCC)  Assessment & Plan  Patient started on aspirin, beta-blocker, ACE and statin therapy  Patient with triple-vessel disease  Initially evaluated for CABG, now for PCI on 03/27    Aneurysm of left ventricle of heart  Assessment & Plan  Noted on left heart catheterization    Dilated cardiomyopathy (HCC)  Assessment & Plan  Noted on left heart catheterization    Obesity (BMI 35.0-39.9 without comorbidity)  Assessment &  Plan  Encourage diet exercise and weight loss    Dyslipidemia  Assessment & Plan  Continue with statin therapy  LDL at goal    Uncontrolled type 2 diabetes mellitus with hyperglycemia (HCC)  Assessment & Plan  Patient with hemoglobin A1c of 7.6, he reports when he was initially diagnosed his hemoglobin A1c was 11  Upon discharge she will resume his Metformin and glyburide and nocturnal Lantus  While in house we will continue with Lantus and insulin sliding scale, hypoglycemia protocol in place  Carb consistent/cardiac diet    Abnormal EKG- (present on admission)  Assessment & Plan  EKG noted 1 mm ST elevation throughout V2 through V6.  Cardiology was consulted who recommended transfer to our facility.       VTE prophylaxis: SCDs/TEDs and therapeutic anticoagulation with Heparin Drip    I have performed a physical exam and reviewed and updated ROS and Plan today (3/27/2022). In review of yesterday's note (3/26/2022), there are no changes except as documented above.

## 2022-03-27 NOTE — CARE PLAN
The patient is Stable - Low risk of patient condition declining or worsening    Shift Goals  Clinical Goals: monitor labs, prepare for cath  Patient Goals: comfort, rest  Family Goals: ISIS. No family present.    Progress made toward(s) clinical / shift goals: monitor labs, prepare for cath    Patient is not progressing towards the following goals:    Problem: Hemodynamics  Goal: Patient's hemodynamics, fluid balance and neurologic status will be stable or improve  Outcome: Progressing     Problem: Care Map:  Admission Optimal Outcome for the Heart Failure Patient  Goal: Admission:  Optimal Care of the heart failure patient  Outcome: Progressing     Problem: Care Map:  Day 1 Optimal Outcome for the Heart Failure Patient  Goal: Day 1:  Optimal Care of the heart failure patient  Outcome: Progressing     Problem: Care Map:  Day 2 Optimal Outcome for the Heart Failure Patient  Goal: Day 2:  Optimal Care of the heart failure patient  Outcome: Progressing     Problem: Care Map:  Day 3 Optimal Outcome for the Heart Failure Patient  Goal: Day 3:  Optimal Care of the heart failure patient  Outcome: Progressing

## 2022-03-27 NOTE — ASSESSMENT & PLAN NOTE
Patient started on aspirin, beta-blocker, ACE and statin therapy  Patient with triple-vessel disease  Initially evaluated for CABG, now for PCI on 03/27

## 2022-03-27 NOTE — PROGRESS NOTES
Bedside report received from NOC RN. Assumed care of pt. Pt awake, laying in bed. A/Ox4, VSS. No concerns, complaints or distress. Pt educated to call before getting out of bed. POC reviewed and white board updated. Tele box on. SR 75on the monitor. Call light in reach. Bed locked in lowest position with 2 upper bed rails up. Bed alarm on.

## 2022-03-27 NOTE — PROCEDURES
Cardiac Catheterization report    3/27/2022  12:16 PM    REFERRING MD: Dr. Maddy Barillas    INDICATION/PREOPERATIVE DIAGNOSIS:  1. Cardiomyopathy  2. Multivessel coronary artery disease    POSTOPERATIVE DIAGNOSIS:  1. Successful PCI ramus intermedius (2.25 x 22 mm Baljinder CHELE), excellent result  2. Successful complex bifurcation PCI LAD and D1, excellent result  3. Residual  LCx and small vessel distal RCA disease    RECOMMENDATIONS:  Guideline directed medical therapy   Cardiovascular Risk factor modification  Dual antiplatelet therapy for minimum of 1 year lifelong as tolerated      PROCEDURES PERFORMED:  · Left heart catheterization   · Supervision moderate sedation  · Percutaneous coronary intervention ramus intermedius  · Continue current intervention LAD and diagonal    HISTORY:   Presents for heart failure and exertional chest discomfort/shortness of breath fatigue and is noted to have multivessel coronary artery disease.  After discussion with cardiothoracic surgery the patient and interventional cardiology decision was made to proceed with percutaneous coronary intervention of the vessels amenable to such which would include the ramus and LAD plus minus diagonal.   of the LCx and distal RCA disease is not amenable to PCI or CABG.    FINDINGS:  I.  HEMODYNAMICS   Ao: 81/58 mmHg   LEDP: 12 mmHg   Gradient on LV pullback: No      Procedure details:  The risks and benefits of cardiac catheterization and possible intervention were explained to the patient including death, heart attack, stroke, and emergency surgery.  The patient was brought to the cardiac catheterization lab where the right wrist was prepped and draped in the usual manner for cardiac catheterization.  The area was anesthetized with lidocaine and a 5/6 FR sheath was inserted into the right radial artery without difficulty. Aortic valve was crossed using pigtail catheter for left heart catheterization was performed.     Description of PCI:  The  "decision was made to intervene on the culprit coronary artery.  Anticoagulation was initiated as below.  The diagnostic catheter was exchanged over a wire for an 6 [place] EBU 3.5 guide seated appropriately. A 0.014\" mm  Runthrough was advanced into the artery and use to cross the lesion in the ramus. A 2.0 mm balloon was used to predilate the lesion. A 2.25 x 20 mm Baljinder CHELE was then positioned and deployed at nominal pressure. Following this a stenting balloon to high pressure was used to post dilate the stent. There was an excellent angiographic result with JULISSA-3 flow and no residual stenosis in the stented segment.     Wire was then negotiated into the LAD and the lesion was predilated with a 2.5 mm noncompliant balloon.  Second wire was advanced into the diagonal proximal bifurcation lesion.  Diagonals predilated with a  1.2 mm balloon followed by 2.0 mm balloon a 2.25 x 18 mm Baljinder CHELE was deployed in the mid section of the diagonal.  Bifurcation was then addressed with a mini crush technique.  A second 2.25 x 15 mm Baljinder CHELE was positioned in the diagonal overlapping into the LAD by 1 to 2 mm.  A 2.5 mm noncompliant balloon was positioned concomitantly in the LAD proper.  The sidebranch stent was deployed and optimized and equipment withdrawn from the sidebranch.  The LAD balloon was then inflated to high pressures.  Subsequently the LAD was addressed with a 2.5 x 15 mm Baljinder CHELE and postdilated with a 2.5 mm noncompliant balloon to 20 olive.  Excellent angiographic result with no residual stenosis in the stented segments.  JULISSA-3 flow post procedure.  All catheters and guidewires were removed and the patient left the cath lab in stable condition.    At the completion of the case the sheath was removed and hemostasis achieved utilizing a radial compression band patient with Cath Lab in stable condition and there were no apparent complications.    Anticoagulant: Angiomax  Antiplatelet: Effient " (prasugrel)  EBL <25 cc  Complications: none  Specimens: none  Contrast: 150cc    Complications:  None apparent    Sedation time:  Moderate sedation directly monitored by me during the case while supervising the administration of the sedation medication by an independent trained RN to assist in the monitoring of the patient's level of consciousness and physiological status. I, the supervising physician was present the entire time from beginning of medication administration until the end of the procedure from 10:55 AM until 11:59 AM. For detailed administration records please see the moderate sedation documentation in the media tab.    Following the procedure I discussed the results with the patient and the patients designated contact/family member immediately following the procedure in person.    Quinn Blackmon MD, FACC, [facility]

## 2022-03-28 ENCOUNTER — PHARMACY VISIT (OUTPATIENT)
Dept: PHARMACY | Facility: MEDICAL CENTER | Age: 62
End: 2022-03-28
Payer: COMMERCIAL

## 2022-03-28 VITALS
BODY MASS INDEX: 36.49 KG/M2 | HEART RATE: 81 BPM | WEIGHT: 254.85 LBS | OXYGEN SATURATION: 93 % | TEMPERATURE: 97.9 F | RESPIRATION RATE: 18 BRPM | SYSTOLIC BLOOD PRESSURE: 138 MMHG | HEIGHT: 70 IN | DIASTOLIC BLOOD PRESSURE: 81 MMHG

## 2022-03-28 LAB
ANION GAP SERPL CALC-SCNC: 11 MMOL/L (ref 7–16)
BUN SERPL-MCNC: 9 MG/DL (ref 8–22)
CALCIUM SERPL-MCNC: 8.6 MG/DL (ref 8.5–10.5)
CHLORIDE SERPL-SCNC: 107 MMOL/L (ref 96–112)
CO2 SERPL-SCNC: 20 MMOL/L (ref 20–33)
CREAT SERPL-MCNC: 0.63 MG/DL (ref 0.5–1.4)
ERYTHROCYTE [DISTWIDTH] IN BLOOD BY AUTOMATED COUNT: 37 FL (ref 35.9–50)
GFR SERPLBLD CREATININE-BSD FMLA CKD-EPI: 109 ML/MIN/1.73 M 2
GLUCOSE BLD STRIP.AUTO-MCNC: 114 MG/DL (ref 65–99)
GLUCOSE BLD STRIP.AUTO-MCNC: 125 MG/DL (ref 65–99)
GLUCOSE BLD STRIP.AUTO-MCNC: 143 MG/DL (ref 65–99)
GLUCOSE BLD STRIP.AUTO-MCNC: 165 MG/DL (ref 65–99)
GLUCOSE BLD STRIP.AUTO-MCNC: 182 MG/DL (ref 65–99)
GLUCOSE SERPL-MCNC: 126 MG/DL (ref 65–99)
HCT VFR BLD AUTO: 41 % (ref 42–52)
HGB BLD-MCNC: 13.7 G/DL (ref 14–18)
MCH RBC QN AUTO: 26.4 PG (ref 27–33)
MCHC RBC AUTO-ENTMCNC: 33.4 G/DL (ref 33.7–35.3)
MCV RBC AUTO: 79 FL (ref 81.4–97.8)
PLATELET # BLD AUTO: 191 K/UL (ref 164–446)
PMV BLD AUTO: 10.3 FL (ref 9–12.9)
POTASSIUM SERPL-SCNC: 4 MMOL/L (ref 3.6–5.5)
RBC # BLD AUTO: 5.19 M/UL (ref 4.7–6.1)
SODIUM SERPL-SCNC: 138 MMOL/L (ref 135–145)
WBC # BLD AUTO: 9.7 K/UL (ref 4.8–10.8)

## 2022-03-28 PROCEDURE — 700102 HCHG RX REV CODE 250 W/ 637 OVERRIDE(OP): Performed by: GENERAL PRACTICE

## 2022-03-28 PROCEDURE — 99232 SBSQ HOSP IP/OBS MODERATE 35: CPT | Performed by: INTERNAL MEDICINE

## 2022-03-28 PROCEDURE — RXMED WILLOW AMBULATORY MEDICATION CHARGE: Performed by: GENERAL PRACTICE

## 2022-03-28 PROCEDURE — 99239 HOSP IP/OBS DSCHRG MGMT >30: CPT | Performed by: GENERAL PRACTICE

## 2022-03-28 PROCEDURE — 85027 COMPLETE CBC AUTOMATED: CPT

## 2022-03-28 PROCEDURE — 36415 COLL VENOUS BLD VENIPUNCTURE: CPT

## 2022-03-28 PROCEDURE — A9270 NON-COVERED ITEM OR SERVICE: HCPCS | Performed by: INTERNAL MEDICINE

## 2022-03-28 PROCEDURE — 700102 HCHG RX REV CODE 250 W/ 637 OVERRIDE(OP): Performed by: INTERNAL MEDICINE

## 2022-03-28 PROCEDURE — 82962 GLUCOSE BLOOD TEST: CPT

## 2022-03-28 PROCEDURE — 80048 BASIC METABOLIC PNL TOTAL CA: CPT

## 2022-03-28 PROCEDURE — A9270 NON-COVERED ITEM OR SERVICE: HCPCS | Performed by: GENERAL PRACTICE

## 2022-03-28 RX ORDER — ASPIRIN 81 MG/1
81 TABLET ORAL DAILY
Qty: 30 TABLET | Refills: 0 | Status: SHIPPED | OUTPATIENT
Start: 2022-03-29 | End: 2022-04-13 | Stop reason: SDUPTHER

## 2022-03-28 RX ORDER — LISINOPRIL 5 MG/1
5 TABLET ORAL DAILY
Qty: 30 TABLET | Refills: 0 | Status: SHIPPED | OUTPATIENT
Start: 2022-03-29 | End: 2022-04-13 | Stop reason: SDUPTHER

## 2022-03-28 RX ORDER — CARVEDILOL 6.25 MG/1
6.25 TABLET ORAL 2 TIMES DAILY WITH MEALS
Qty: 60 TABLET | Refills: 0 | Status: SHIPPED | OUTPATIENT
Start: 2022-03-28 | End: 2022-04-13

## 2022-03-28 RX ORDER — PRASUGREL 10 MG/1
10 TABLET, FILM COATED ORAL DAILY
Qty: 30 TABLET | Refills: 0 | Status: SHIPPED | OUTPATIENT
Start: 2022-03-29 | End: 2022-04-13 | Stop reason: SDUPTHER

## 2022-03-28 RX ADMIN — ASPIRIN 81 MG: 81 TABLET, COATED ORAL at 05:13

## 2022-03-28 RX ADMIN — LISINOPRIL 5 MG: 5 TABLET ORAL at 05:14

## 2022-03-28 RX ADMIN — PRASUGREL 10 MG: 10 TABLET, FILM COATED ORAL at 05:13

## 2022-03-28 RX ADMIN — CARVEDILOL 6.25 MG: 6.25 TABLET, FILM COATED ORAL at 05:14

## 2022-03-28 RX ADMIN — INSULIN HUMAN 1 UNITS: 100 INJECTION, SOLUTION PARENTERAL at 11:53

## 2022-03-28 NOTE — DISCHARGE INSTRUCTIONS
Please continue taking the following medications, aspirin 81 mg daily, Effient 10 mg daily, carvedilol 6.25 mg twice a day, lisinopril 5 mg daily, continue with your Lipitor 40 mg every evening, and continue with your diabetes medications.    Please ensure you have close follow-up with your primary care physician, and cardiology I have listed the information for the cardiologist group that you have seen here.    Please ensure that you are enrolled in the cardiac rehab in [place].    Please continue with a low-salt diet with fluid restriction of less than 2 L.    Please take care and be well!    Discharge Instructions    Discharged to home by car with relative. Discharged via wheelchair, hospital escort: Yes.  Special equipment needed: Not Applicable    Be sure to schedule a follow-up appointment with your primary care doctor or any specialists as instructed.     Discharge Plan:   Influenza Vaccine Indication: Indicated: 9 to 64 years of age    I understand that a diet low in cholesterol, fat, and sodium is recommended for good health. Unless I have been given specific instructions below for another diet, I accept this instruction as my diet prescription.       Special Instructions:   HF Patient Discharge Instructions  · Monitor your weight daily, and maintain a weight chart, to track your weight changes.   · Activity as tolerated, unless your Doctor has ordered otherwise.   · Follow a low fat, low cholesterol, low salt diet unless instructed otherwise by your Doctor. Read the labels on the back of food products and track your intake of fat, cholesterol and salt.   · Fluid Restriction No. If a Fluid Restriction has been ordered by your Doctor, measure fluids with a measuring cup to ensure that you are not exceeding the restriction.   · No smoking.  · Oxygen No. If your Doctor has ordered that you wear Oxygen at home, it is important to wear it as ordered.  · Did you receive an explanation from staff on the  importance of taking each of your medications and why it is necessary to keep taking them unless your doctor says to stop? Yes  · Were all of your questions answered about how to manage your heart failure and what to do if you have increased signs and symptoms after you go home? Yes  · Do you feel like your heart failure care team involved you in the care treatment plan and allowed you to make decisions regarding your care while in the hospital and addressed any discharge needs you might have? Yes    See the educational handout provided at discharge for more information on monitoring your daily weight, activity and diet. This also explains more about Heart Failure, symptoms of a flare-up and some of the tests that you have undergone.     Warning Signs of a Flare-Up include:  · Swelling in the ankles or lower legs.  · Shortness of breath, while at rest, or while doing normal activities.   · Shortness of breath at night when in bed, or coughing in bed.   · Requiring more pillows to sleep at night, or needing to sit up at night to sleep.  · Feeling weak, dizzy or fatigued.     When to call your Doctor:  · Call [facility] seven days a week from 8:00 a.m. to 8:00 p.m. for medical questions [phone].  · Call your Primary Care Physician or Cardiologist if:   1. You experience any pain radiating to your jaw or neck.  2. You have any difficulty breathing.  3. You experience weight gain of 3 lbs in a day or 5 lbs in a week.   4. You feel any palpitations or irregular heartbeats.  5. You become dizzy or lose consciousness.   If you have had an angiogram or had a pacemaker or AICD placed, and experience:  1. Bleeding, drainage or swelling at the surgical / puncture site.  2. Fever greater than 100.0 F  3. Shock from internal defibrillator.  4. Cool and / or numb extremities.      · Is patient discharged on Warfarin / Coumadin?   No     Depression / Suicide Risk    As you are discharged from this Critical access hospital  facility, it is important to learn how to keep safe from harming yourself.    Recognize the warning signs:  · Abrupt changes in personality, positive or negative- including increase in energy   · Giving away possessions  · Change in eating patterns- significant weight changes-  positive or negative  · Change in sleeping patterns- unable to sleep or sleeping all the time   · Unwillingness or inability to communicate  · Depression  · Unusual sadness, discouragement and loneliness  · Talk of wanting to die  · Neglect of personal appearance   · Rebelliousness- reckless behavior  · Withdrawal from people/activities they love  · Confusion- inability to concentrate     If you or a loved one observes any of these behaviors or has concerns about self-harm, here's what you can do:  · Talk about it- your feelings and reasons for harming yourself  · Remove any means that you might use to hurt yourself (examples: pills, rope, extension cords, firearm)  · Get professional help from the community (Mental Health, Substance Abuse, psychological counseling)  · Do not be alone:Call your Safe Contact- someone whom you trust who will be there for you.  · Call your local CRISIS HOTLINE 744-2580 or 635-726-6488  · Call your local Children's Mobile Crisis Response Team [place] (799) 331-3592 or www.[employer]  · Call the toll free National Suicide Prevention Hotlines   · National Suicide Prevention Lifeline 172-965-BOEW (9482)  · National Hope Line Network 800-SUICIDE (009-5761)      Heart Failure, Diagnosis    Heart failure means that your heart is not able to pump blood in the right way. This makes it hard for your body to work well. Heart failure is usually a long-term (chronic) condition. You must take good care of yourself and follow your treatment plan from your doctor.  What are the causes?  This condition may be caused by:  · High blood pressure.  · Build up of cholesterol and fat in the arteries.  · Heart attack. This  injures the heart muscle.  · Heart valves that do not open and close properly.  · Damage of the heart muscle. This is also called cardiomyopathy.  · Lung disease.  · Abnormal heart rhythms.  What increases the risk?  The risk of heart failure goes up as a person ages. This condition is also more likely to develop in people who:  · Are overweight.  · Are male.  · Smoke or chew tobacco.  · Abuse alcohol or illegal drugs.  · Have taken medicines that can damage the heart.  · Have diabetes.  · Have abnormal heart rhythms.  · Have thyroid problems.  · Have low blood counts (anemia).  What are the signs or symptoms?  Symptoms of this condition include:  · Shortness of breath.  · Coughing.  · Swelling of the feet, ankles, legs, or belly.  · Losing weight for no reason.  · Trouble breathing.  · Waking from sleep because of the need to sit up and get more air.  · Rapid heartbeat.  · Being very tired.  · Feeling dizzy, or feeling like you may pass out (faint).  · Having no desire to eat.  · Feeling like you may vomit (nauseous).  · Peeing (urinating) more at night.  · Feeling confused.  How is this treated?         This condition may be treated with:  · Medicines. These can be given to treat blood pressure and to make the heart muscles stronger.  · Changes in your daily life. These may include eating a healthy diet, staying at a healthy body weight, quitting tobacco and illegal drug use, or doing exercises.  · Surgery. Surgery can be done to open blocked valves, or to put devices in the heart, such as pacemakers.  · A donor heart (heart transplant). You will receive a healthy heart from a donor.  Follow these instructions at home:  · Treat other conditions as told by your doctor. These may include high blood pressure, diabetes, thyroid disease, or abnormal heart rhythms.  · Learn as much as you can about heart failure.  · Get support as you need it.  · Keep all follow-up visits as told by your doctor. This is  important.  Summary  · Heart failure means that your heart is not able to pump blood in the right way.  · This condition is caused by high blood pressure, heart attack, or damage of the heart muscle.  · Symptoms of this condition include shortness of breath and swelling of the feet, ankles, legs, or belly. You may also feel very tired or feel like you may vomit.  · You may be treated with medicines, surgery, or changes in your daily life.  · Treat other health conditions as told by your doctor.  This information is not intended to replace advice given to you by your health care provider. Make sure you discuss any questions you have with your health care provider.  Document Released: 09/26/2009 Document Revised: 03/06/2020 Document Reviewed: 03/06/2020  Elsevier Patient Education © 2020 [employer]      Heart Failure Action Plan  A heart failure action plan helps you understand what to do when you have symptoms of heart failure. Follow the plan that was created by you and your health care provider. Review your plan each time you visit your health care provider.  Red zone  These signs and symptoms mean you should get medical help right away:  · You have trouble breathing when resting.  · You have a dry cough that is getting worse.  · You have swelling or pain in your legs or abdomen that is getting worse.  · You suddenly gain more than 2-3 lb (0.9-1.4 kg) in a day, or more than 5 lb (2.3 kg) in one week. This amount may be more or less depending on your condition.  · You have trouble staying awake or you feel confused.  · You have chest pain.  · You do not have an appetite.  · You pass out.  If you experience any of these symptoms:  · Call your local emergency services (911 in the [place] right away or seek help at the emergency department of the nearest hospital.  Yellow zone  These signs and symptoms mean your condition may be getting worse and you should make some changes:  · You have trouble breathing when you are  active or you need to sleep with extra pillows.  · You have swelling in your legs or abdomen.  · You gain 2-3 lb (0.9-1.4 kg) in one day, or 5 lb (2.3 kg) in one week. This amount may be more or less depending on your condition.  · You get tired easily.  · You have trouble sleeping.  · You have a dry cough.  If you experience any of these symptoms:  · Contact your health care provider within the next day.  · Your health care provider may adjust your medicines.  Green zone  These signs mean you are doing well and can continue what you are doing:  · You do not have shortness of breath.  · You have very little swelling or no new swelling.  · Your weight is stable (no gain or loss).  · You have a normal activity level.  · You do not have chest pain or any other new symptoms.  Follow these instructions at home:  · Take over-the-counter and prescription medicines only as told by your health care provider.  · Weigh yourself daily. Your target weight is __________ lb (__________ kg).  ? Call your health care provider if you gain more than __________ lb (__________ kg) in a day, or more than __________ lb (__________ kg) in one week.  · Eat a heart-healthy diet. Work with a diet and nutrition specialist (dietitian) to create an eating plan that is best for you.  · Keep all follow-up visits as told by your health care provider. This is important.  Where to find more information  · American Heart Association: www.heart.org  Summary  · Follow the action plan that was created by you and your health care provider.  · Get help right away if you have any symptoms in the Red zone.  This information is not intended to replace advice given to you by your health care provider. Make sure you discuss any questions you have with your health care provider.  Document Released: 01/27/2018 Document Revised: 11/30/2018 Document Reviewed: 01/27/2018  Elsevier Patient Education © 2020 [employer]      Heart Failure Eating Plan  Heart failure,  "also called congestive heart failure, occurs when your heart does not pump blood well enough to meet your body's needs for oxygen-rich blood. Heart failure is a long-term (chronic) condition. Living with heart failure can be challenging. However, following your health care provider's instructions about a healthy lifestyle and working with a diet and nutrition specialist (dietitian) to choose the right foods may help to improve your symptoms.  What are tips for following this plan?  Reading food labels  · Check food labels for the amount of sodium per serving. Choose foods that have less than 140 mg (milligrams) of sodium in each serving.  · Check food labels for the number of calories per serving. This is important if you need to limit your daily calorie intake to lose weight.  · Check food labels for the serving size. If you eat more than one serving, you will be eating more sodium and calories than what is listed on the label.  · Look for foods that are labeled as \"sodium-free,\" \"very low sodium,\" or \"low sodium.\"  ? Foods labeled as \"reduced sodium\" or \"lightly salted\" may still have more sodium than what is recommended for you.  Cooking  · Avoid adding salt when cooking. Ask your health care provider or dietitian before using salt substitutes.  · Season food with salt-free seasonings, spices, or herbs. Check the label of seasoning mixes to make sure they do not contain salt.  · Cook with heart-healthy oils, such as olive, canola, soybean, or sunflower oil.  · Do not thompson foods. Cook foods using low-fat methods, such as baking, boiling, grilling, and broiling.  · Limit unhealthy fats when cooking by:  ? Removing the skin from poultry, such as chicken.  ? Removing all visible fats from meats.  ? Skimming the fat off from stews, soups, and gravies before serving them.  Meal planning    · Limit your intake of:  ? Processed, canned, or pre-packaged foods.  ? Foods that are high in trans fat, such as fried " foods.  ? Sweets, desserts, sugary drinks, and other foods with added sugar.  ? Full-fat dairy products, such as whole milk.  · Eat a balanced diet that includes:  ? 4-5 servings of fruit each day and 4-5 servings of vegetables each day. At each meal, try to fill half of your plate with fruits and vegetables.  ? Up to 6-8 servings of whole grains each day.  ? Up to 2 servings of lean meat, poultry, or fish each day. One serving of meat is equal to 3 oz. This is about the same size as a deck of cards.  ? 2 servings of low-fat dairy each day.  ? Heart-healthy fats. Healthy fats called omega-3 fatty acids are found in foods such as flaxseed and cold-water fish like sardines, salmon, and mackerel.  · Aim to eat 25-35 g (grams) of fiber a day. Foods that are high in fiber include apples, broccoli, carrots, beans, peas, and whole grains.  · Do not add salt or condiments that contain salt (such as soy sauce) to foods before eating.  · When eating at a restaurant, ask that your food be prepared with less salt or no salt, if possible.  · Try to eat 2 or more vegetarian meals each week.  · Eat more home-cooked food and eat less restaurant, buffet, and fast food.  General information  · Do not eat more than 2,300 mg of salt (sodium) a day. The amount of sodium that is recommended for you may be lower, depending on your condition.  · Maintain a healthy body weight as directed. Ask your health care provider what a healthy weight is for you.  ? Check your weight every day.  ? Work with your health care provider and dietitian to make a plan that is right for you to lose weight or maintain your current weight.  · Limit how much fluid you drink. Ask your health care provider or dietitian how much fluid you can have each day.  · Limit or avoid alcohol as told by your health care provider or dietitian.  Recommended foods  The items listed may not be a complete list. Talk with your dietitian about what dietary choices are best for  you.  Fruits  All fresh, frozen, and canned fruits. Dried fruits, such as raisins, prunes, and cranberries.  Vegetables  All fresh vegetables. Vegetables that are frozen without sauce or added salt. Low-sodium or sodium-free canned vegetables.  Grains  Bread with less than 80 mg of sodium per slice. Whole-wheat pasta, quinoa, and brown rice. Oats and oatmeal. Barley. Millet. Grits and cream of wheat. Whole-grain and whole-wheat cold cereal.  Meats and other protein foods  Lean cuts of meat. Skinless chicken and turkey. Fish with high omega-3 fatty acids, such as salmon, sardines, and other cold-water fishes. Eggs. Dried beans, peas, and edamame. Unsalted nuts and nut butters.  Dairy  Low-fat or nonfat (skim) milk and dried milk. Rice milk, soy milk, and almond milk. Low-fat or nonfat yogurt. Small amounts of reduced-sodium block cheese. Low-sodium cottage cheese.  Fats and oils  Olive, canola, soybean, flaxseed, or sunflower oil. Avocado.  Sweets and desserts  Apple sauce. Granola bars. Sugar-free pudding and gelatin. Frozen fruit bars.  Seasoning and other foods  Fresh and dried herbs. Lemon or lime juice. Vinegar. Low-sodium ketchup. Salt-free marinades, salad dressings, sauces, and seasonings.  The items listed above may not be a complete list of foods and beverages you can eat. Contact a dietitian for more information.  Foods to avoid  The items listed may not be a complete list. Talk with your dietitian about what dietary choices are best for you.  Fruits  Fruits that are dried with sodium-containing preservatives.  Vegetables  Canned vegetables. Frozen vegetables with sauce or seasonings. Creamed vegetables. French fries. Onion rings. Pickled vegetables and sauerkraut.  Grains  Bread with more than 80 mg of sodium per slice. Hot or cold cereal with more than 140 mg sodium per serving. Salted pretzels and crackers. Pre-packaged breadcrumbs. Bagels, croissants, and biscuits.  Meats and other protein foods  Ribs  and chicken wings. [place], ham, pepperoni, bologna, salami, and packaged luncheon meats. Hot dogs, bratwurst, and sausage. Canned meat. Smoked meat and fish. Salted nuts and seeds.  Dairy  Whole milk, half-and-half, and cream. Buttermilk. Processed cheese, cheese spreads, and cheese curds. Regular cottage cheese. Feta cheese. Shredded cheese. String cheese.  Fats and oils  Butter, lard, shortening, ghee, and lima fat. Canned and packaged gravies.  Seasoning and other foods  Onion salt, garlic salt, table salt, and sea salt. Marinades. Regular salad dressings. Relishes, pickles, and olives. Meat flavorings and tenderizers, and bouillon cubes. Horseradish, ketchup, and mustard. [facility] Hospital[facility] sauce. Teriyaki sauce, soy sauce (including reduced sodium). Hot sauce and Tabasco sauce. Steak sauce, fish sauce, oyster sauce, and cocktail sauce. Taco seasonings. Barbecue sauce. Tartar sauce.  The items listed above may not be a complete list of foods and beverages you should avoid. Contact a dietitian for more information.  Summary  · A heart failure eating plan includes changes that limit your intake of sodium and unhealthy fat, and it may help you lose weight or maintain a healthy weight. Your health care provider may also recommend limiting how much fluid you drink.  · Most people with heart failure should eat no more than 2,300 mg of salt (sodium) a day. The amount of sodium that is recommended for you may be lower, depending on your condition.  · Contact your health care provider or dietitian before making any major changes to your diet.  This information is not intended to replace advice given to you by your health care provider. Make sure you discuss any questions you have with your health care provider.  Document Released: 05/04/2018 Document Revised: 02/13/2020 Document Reviewed: 05/04/2018  Elsevier Patient Education © 2020 Else[employer] Inc.      Coronary Angiogram With Stent  Coronary angiogram with stent placement is  a procedure to widen or open a narrow blood vessel of the heart (coronary artery). Arteries may become blocked by cholesterol buildup (plaques) in the lining of the wall. When a coronary artery becomes partially blocked, blood flow to that area decreases. This may lead to chest pain or a heart attack (myocardial infarction).  A stent is a small piece of metal that looks like mesh or a spring. Stent placement may be done as treatment for a heart attack or right after a coronary angiogram in which a blocked artery is found.  Let your health care provider know about:  · Any allergies you have.  · All medicines you are taking, including vitamins, herbs, eye drops, creams, and over-the-counter medicines.  · Any problems you or family members have had with anesthetic medicines.  · Any blood disorders you have.  · Any surgeries you have had.  · Any medical conditions you have.  · Whether you are pregnant or may be pregnant.  What are the risks?  Generally, this is a safe procedure. However, problems may occur, including:  · Damage to the heart or its blood vessels.  · A return of blockage.  · Bleeding, infection, or bruising at the insertion site.  · A collection of blood under the skin (hematoma) at the insertion site.  · A blood clot in another part of the body.  · Kidney injury.  · Allergic reaction to the dye or contrast that is used.  · Bleeding into the abdomen (retroperitoneal bleeding).  What happens before the procedure?  Staying hydrated  Follow instructions from your health care provider about hydration, which may include:  · Up to 2 hours before the procedure - you may continue to drink clear liquids, such as water, clear fruit juice, black coffee, and plain tea.    Eating and drinking restrictions  Follow instructions from your health care provider about eating and drinking, which may include:  · 8 hours before the procedure - stop eating heavy meals or foods such as meat, fried foods, or fatty foods.  · 6  hours before the procedure - stop eating light meals or foods, such as toast or cereal.  · 2 hours before the procedure - stop drinking clear liquids.  Ask your health care provider about:  · Changing or stopping your regular medicines. This is especially important if you are taking diabetes medicines or blood thinners.  · Taking medicines such as ibuprofen. These medicines can thin your blood. Do not take these medicines before your procedure if your health care provider instructs you not to. Generally, aspirin is recommended before a procedure of passing a small, thin tube (catheter) through a blood vessel and into the heart (cardiac catheterization).  What happens during the procedure?    · An IV tube will be inserted into one of your veins.  · You will be given one or more of the following:  ? A medicine to help you relax (sedative).  ? A medicine to numb the area where the catheter will be inserted into an artery (local anesthetic).  · To reduce your risk of infection:  ? Your health care team will wash or sanitize their hands.  ? Your skin will be washed with soap.  ? Hair may be removed from the area where the catheter will be inserted.  · Using a guide wire, the catheter will be inserted into an artery. The location may be in your groin, in your wrist, or in the fold of your arm (near your elbow).  · A type of X-ray (fluoroscopy) will be used to help guide the catheter to the opening of the arteries in the heart.  · A dye will be injected into the catheter, and X-rays will be taken. The dye will help to show where any narrowing or blockages are located in the arteries.  · A tiny wire will be guided to the blocked spot, and a balloon will be inflated to make the artery wider.  · The stent will be expanded and will crush the plaques into the wall of the vessel. The stent will hold the area open and improve the blood flow. Most stents have a drug coating to reduce the risk of the stent narrowing over  time.  · The artery may be made wider using a drill, laser, or other tools to remove plaques.  · When the blood flow is better, the catheter will be removed. The lining of the artery will grow over the stent, which stays where it was placed.  This procedure may vary among health care providers and hospitals.  What happens after the procedure?  · If the procedure is done through the leg, you will be kept in bed lying flat for about 6 hours. You will be instructed to not bend and not cross your legs.  · The insertion site will be checked frequently.  · The pulse in your foot or wrist will be checked frequently.  · You may have additional blood tests, X-rays, and a test that records the electrical activity of your heart (electrocardiogram, or ECG).  This information is not intended to replace advice given to you by your health care provider. Make sure you discuss any questions you have with your health care provider.  Document Released: 06/23/2004 Document Revised: 03/29/2019 Document Reviewed: 07/23/2017  [employer] Patient Education © 2020 [employer]      Radial Site Care    This sheet gives you information about how to care for yourself after your procedure. Your health care provider may also give you more specific instructions. If you have problems or questions, contact your health care provider.  What can I expect after the procedure?  After the procedure, it is common to have:  · Bruising and tenderness at the catheter insertion area.  Follow these instructions at home:  Medicines  · Take over-the-counter and prescription medicines only as told by your health care provider.  Insertion site care  · Follow instructions from your health care provider about how to take care of your insertion site. Make sure you:  ? Wash your hands with soap and water before you change your bandage (dressing). If soap and water are not available, use hand .  ? Change your dressing as told by your health care  provider.  ? Leave stitches (sutures), skin glue, or adhesive strips in place. These skin closures may need to stay in place for 2 weeks or longer. If adhesive strip edges start to loosen and curl up, you may trim the loose edges. Do not remove adhesive strips completely unless your health care provider tells you to do that.  · Check your insertion site every day for signs of infection. Check for:  ? Redness, swelling, or pain.  ? Fluid or blood.  ? Pus or a bad smell.  ? Warmth.  · Do not take baths, swim, or use a hot tub until your health care provider approves.  · You may shower 24-48 hours after the procedure, or as directed by your health care provider.  ? Remove the dressing and gently wash the site with plain soap and water.  ? Pat the area dry with a clean towel.  ? Do not rub the site. That could cause bleeding.  · Do not apply powder or lotion to the site.  Activity    · For 24 hours after the procedure, or as directed by your health care provider:  ? Do not flex or bend the affected arm.  ? Do not push or pull heavy objects with the affected arm.  ? Do not drive yourself home from the hospital or clinic. You may drive 24 hours after the procedure unless your health care provider tells you not to.  ? Do not operate machinery or power tools.  · Do not lift anything that is heavier than 10 lb (4.5 kg), or the limit that you are told, until your health care provider says that it is safe.  · Ask your health care provider when it is okay to:  ? Return to work or school.  ? Resume usual physical activities or sports.  ? Resume sexual activity.  General instructions  · If the catheter site starts to bleed, raise your arm and put firm pressure on the site. If the bleeding does not stop, get help right away. This is a medical emergency.  · If you went home on the same day as your procedure, a responsible adult should be with you for the first 24 hours after you arrive home.  · Keep all follow-up visits as told by  your health care provider. This is important.  Contact a health care provider if:  · You have a fever.  · You have redness, swelling, or yellow drainage around your insertion site.  Get help right away if:  · You have unusual pain at the radial site.  · The catheter insertion area swells very fast.  · The insertion area is bleeding, and the bleeding does not stop when you hold steady pressure on the area.  · Your arm or hand becomes pale, cool, tingly, or numb.  These symptoms may represent a serious problem that is an emergency. Do not wait to see if the symptoms will go away. Get medical help right away. Call your local emergency services (911 in the [place] Do not drive yourself to the hospital.  Summary  · After the procedure, it is common to have bruising and tenderness at the site.  · Follow instructions from your health care provider about how to take care of your radial site wound. Check the wound every day for signs of infection.  · Do not lift anything that is heavier than 10 lb (4.5 kg), or the limit that you are told, until your health care provider says that it is safe.  This information is not intended to replace advice given to you by your health care provider. Make sure you discuss any questions you have with your health care provider.  Document Released: 01/20/2012 Document Revised: 01/23/2019 Document Reviewed: 01/23/2019  [employer] Patient Education © 2020 [employer]

## 2022-03-28 NOTE — DISCHARGE SUMMARY
Discharge Summary    CHIEF COMPLAINT ON ADMISSION  No chief complaint on file.      Reason for Admission  ST elevation     Admission Date  3/23/2022    CODE STATUS  Full Code    HPI & HOSPITAL COURSE  This is a 61 year old male with PMHx of hyperlipidemia, type 2 diabetes with A1c of 7.6, and obesity who was transferred from an outside facility on 03/23/2022 with exertional dyspnea and an abnormal EKG noted at PCP's office.    EKG noted 1 mm ST elevation throughout V2 through V6.  Cardiology was consulted who recommended transfer to our facility.  Patient started on a heparin drip. Troponin of 18, 19.  Stress test positive noted apical infarct with melecio-infarct ischemia localized inferiorly, global hypokinesis, LVEF of 35%, with diffuse borderline ST elevation. ECHO noted LVEF 35% UDS negative.    Cardiology consulted, patient is s/p [facility] 3/25 with findings of severe three-vessel disease, with recommendations for CABG evaluation. CTS evaluated patient and had extensive conversation with cardiology, due to the left ventricular apical aneurysm, he would require further work-up with a cardiac MRI or YESI.  CTS recommended for PCI placement instead.  Patient is s/p [facility] with PCI on 03/27, s/p PCI of ramus, left anterior descending artery/diagonal.    Patient supplied nutrition education, [id number] for all medications, he has follow up with Cardiology next week, he has arrangements for cardiac rehab to start late this week or early next week.          Therefore, he is discharged in good and stable condition to home with close outpatient follow-up.    The patient met 2-midnight criteria for an inpatient stay at the time of discharge.    Discharge Date  03/28/2022    FOLLOW UP ITEMS POST DISCHARGE  Primary Care Physician   Cardiology   Cardiac Rehab    DISCHARGE DIAGNOSES  Principal Problem:    Abnormal stress test POA: Unknown  Active Problems:    Abnormal EKG POA: Yes    Uncontrolled type 2 diabetes mellitus with  hyperglycemia (HCC) POA: Unknown    Dyslipidemia POA: Unknown    Obesity (BMI 35.0-39.9 without comorbidity) POA: Unknown    Dilated cardiomyopathy (HCC) POA: Unknown    Aneurysm of left ventricle of heart POA: Unknown    Acute systolic heart failure (HCC) POA: Unknown  Resolved Problems:    * No resolved hospital problems. *      FOLLOW UP  Future Appointments   Date Time Provider Department Center   4/13/2022  1:30 PM MAK Reeder CB None   4/28/2022  1:15 PM Quinn Blackmon M.D. CB None     Landry Donaldson M.D.  [address] [facility] [address]  [phone]    On 3/30/2022  Please arrive to your appt @2:15pm to follow up with your primary provider. Thank you    [facility]  [address]  [address]  [facility] [address] [phone]  Call  Your doctor has referred you for Cardiac Rehab which is important in your recovery. Please call to make an appointment or speak with your local doctor for programs in your area.      MEDICATIONS ON DISCHARGE     Medication List      START taking these medications      Instructions   Adult Aspirin Regimen 81 MG EC tablet  Start taking on: March 29, 2022  Generic drug: aspirin   Take 1 Tablet by mouth every day for 30 days.  Dose: 81 mg     carvedilol 6.25 MG Tabs  Commonly known as: COREG   Take 1 Tablet by mouth 2 times a day with meals for 30 days.  Dose: 6.25 mg     lisinopril 5 MG Tabs  Start taking on: March 29, 2022  Commonly known as: PRINIVIL   Take 1 Tablet by mouth every day for 30 days.  Dose: 5 mg     prasugrel 10 MG Tabs  Start taking on: March 29, 2022  Commonly known as: EFFIENT   Take 1 Tablet by mouth every day for 30 days.  Dose: 10 mg        CONTINUE taking these medications      Instructions   atorvastatin 40 MG Tabs  Commonly known as: LIPITOR   Take 40 mg by mouth every evening.  Dose: 40 mg     GLYBURIDE MICRONIZED PO   Take 5 mg by mouth 3 times a day with meals.  Dose: 5 mg     metformin 1000  MG tablet  Commonly known as: GLUCOPHAGE   Take 1,000 mg by mouth 2 times a day with meals.  Dose: 1,000 mg            Allergies  No Known Allergies    DIET  Orders Placed This Encounter   Procedures   • Diet Order Diet: Cardiac     Standing Status:   Standing     Number of Occurrences:   1     Order Specific Question:   Diet:     Answer:   Cardiac [6]       ACTIVITY  As tolerated and directed by rehab. Cardiac Rehab  Weight bearing as tolerated    CONSULTATIONS  Cardiology  CTS    PROCEDURES  LHC with PCI     LABORATORY  Lab Results   Component Value Date    SODIUM 138 03/28/2022    POTASSIUM 4.0 03/28/2022    CHLORIDE 107 03/28/2022    CO2 20 03/28/2022    GLUCOSE 126 (H) 03/28/2022    BUN 9 03/28/2022    CREATININE 0.63 03/28/2022        Lab Results   Component Value Date    WBC 9.7 03/28/2022    HEMOGLOBIN 13.7 (L) 03/28/2022    HEMATOCRIT 41.0 (L) 03/28/2022    PLATELETCT 191 03/28/2022      EC-ECHOCARDIOGRAM COMPLETE W/ CONT   Final Result      EC-ECHOCARDIOGRAM COMPLETE W/O CONT   Final Result      NM-CARDIAC STRESS TEST   Final Result      CL-LEFT HEART CATHETERIZATION WITH POSSIBLE INTERVENTION    (Results Pending)   CL-LEFT HEART CATHETERIZATION WITH POSSIBLE INTERVENTION    (Results Pending)     Total time of the discharge process exceeds 55 minutes.

## 2022-03-28 NOTE — DISCHARGE PLANNING
Meds-to-Beds: Discharge prescription orders listed below delivered to patient's bedside. RN Stephanie notified. Patient counseled. Patient elected to have co-payment billed to patient account.       Current Outpatient Medications   Medication Sig Dispense Refill   • [START ON 3/29/2022] aspirin 81 MG EC tablet Take 1 Tablet by mouth every day for 30 days. 30 Tablet 0   • carvedilol (COREG) 6.25 MG Tab Take 1 Tablet by mouth 2 times a day with meals for 30 days. 60 Tablet 0   • [START ON 3/29/2022] lisinopril (PRINIVIL) 5 MG Tab Take 1 Tablet by mouth every day for 30 days. 30 Tablet 0   • [START ON 3/29/2022] prasugrel (EFFIENT) 10 MG Tab Take 1 Tablet by mouth every day for 30 days. 30 Tablet 0      Frieda Samuels, PharmD

## 2022-03-28 NOTE — CARE PLAN
The patient is Stable - Low risk of patient condition declining or worsening    Shift Goals  Clinical Goals: monitor pt/vitals  Patient Goals: comfort/rest  Family Goals: ISIS. No family present.    Progress made toward(s) clinical / shift goals:      Patient is not progressing towards the following goals:      Problem: Discharge Barriers/Planning  Goal: Patient's continuum of care needs are met  Outcome: Progressing

## 2022-03-28 NOTE — PROGRESS NOTES
Monitor summary:        Rhythm: SR   Rate: 71-79  Ectopy: (r)PVC, (R)coup  Measurements: 16./.07.35        12hr chart check

## 2022-03-28 NOTE — PROGRESS NOTES
"Interval History:  61-year-old male patient admitted with non-ST elevation MI, found to have cardiomyopathy, multivessel coronary artery disease, underwent successful PCI of ramus branch, left anterior descending artery.  Stable denies chest pain, shortness of breath this morning    Physical Exam   Blood pressure 138/81, pulse 81, temperature 36.6 °C (97.9 °F), temperature source Temporal, resp. rate 18, height 1.778 m (5' 10\"), weight 116 kg (254 lb 13.6 oz), SpO2 93 %.    Constitutional:  Appears well-developed.   HENT: Normocephalic and atraumatic. No scleral icterus.   Neck: No JVD present.   Cardiovascular: Normal rate. Exam reveals no gallop and no friction rub. No murmur heard.   Pulmonary/Chest: CTAB    Abdominal: S/NT/ND BS+   Musculoskeletal: Exhibits no edema. Pulses present.  Skin: Skin is warm and dry.     ROS: As HPI other reviewed and negative       Intake/Output Summary (Last 24 hours) at 3/28/2022 1543  Last data filed at 3/28/2022 1300  Gross per 24 hour   Intake 320 ml   Output --   Net 320 ml       Recent Labs     03/27/22  0654 03/28/22  0247   WBC 7.6 9.7   RBC 5.31 5.19   HEMOGLOBIN 14.2 13.7*   HEMATOCRIT 43.2 41.0*   MCV 81.4 79.0*   MCH 26.7* 26.4*   MCHC 32.9* 33.4*   RDW 38.0 37.0   PLATELETCT 192 191   MPV 11.5 10.3     Recent Labs     03/26/22  0218 03/27/22  0654 03/28/22  0247   SODIUM 136 138 138   POTASSIUM 3.7 4.0 4.0   CHLORIDE 107 105 107   CO2 21 20 20   GLUCOSE 105* 137* 126*   BUN 9 8 9   CREATININE 0.59 0.60 0.63   CALCIUM 8.3* 8.3* 8.6                       No current facility-administered medications on file prior to encounter.     Current Outpatient Medications on File Prior to Encounter   Medication Sig Dispense Refill   • GLYBURIDE MICRONIZED PO Take 5 mg by mouth 3 times a day with meals.     • metformin (GLUCOPHAGE) 1000 MG tablet Take 1,000 mg by mouth 2 times a day with meals.     • atorvastatin (LIPITOR) 40 MG Tab Take 40 mg by mouth every evening.         Current " Facility-Administered Medications   Medication Dose Frequency Provider Last Rate Last Admin   • acetaminophen (Tylenol) tablet 650 mg  650 mg Q6HRS PRN Quinn Blackmon M.D.       • aspirin EC (ECOTRIN) tablet 81 mg  81 mg DAILY Quinn Blackmon M.D.   81 mg at 03/28/22 0513   • prasugrel (EFFIENT) tablet 10 mg  10 mg DAILY Quinn Blackmon M.D.   10 mg at 03/28/22 0513   • bivalirudin (ANGIOMAX) 250 mg in NS 50 mL Infusion  0.2 mg/kg/hr Continuous Quinn Blackmon M.D.   Stopped at 03/27/22 1400   • Pneumococcal 20-Carito Conj Vacc (PREVNAR 20) syringe 0.5 mL  0.5 mL Once PRN Tasha Diamond D.O.       • carvedilol (COREG) tablet 6.25 mg  6.25 mg BID WITH MEALS Quinn Blackmon M.D.   6.25 mg at 03/28/22 0514   • atorvastatin (LIPITOR) tablet 40 mg  40 mg Q EVENING Tasha Diamond D.O.   40 mg at 03/27/22 1645   • lisinopril (PRINIVIL) tablet 5 mg  5 mg Q DAY Tasha Diamond D.O.   5 mg at 03/28/22 0514   • influenza vaccine quad (FLUZONE/FLUARIX) injection 0.5 mL  0.5 mL Once PRN Tasha Diamond D.O.       • insulin regular (HumuLIN R,NovoLIN R) injection  1-6 Units 4X/DAY ACHS Tasha Diamond D.O.   1 Units at 03/28/22 1153    And   • dextrose 10 % BOLUS 25 g  25 g Q15 MIN PRN Tasha Diamond D.O.       • morphine 4 MG/ML injection 2-4 mg  2-4 mg Q5 MIN PRN Baldomero Oconnell M.D.       • nitroglycerin (NITROSTAT) tablet 0.4 mg  0.4 mg Q5 MIN PRN Baldomero Oconnell M.D.       Last reviewed on 3/23/2022 11:54 PM by Katrin Jacobs R.N.    Medications reviewed    Imaging reviewed    ECHO(3/24/22):    CONCLUSIONS  Fair quality study, improved with contrast.  The left ventricular ejection fraction is visually estimated to be 30-  35%, moderately reduced.  Hypokinesis to akinesis of the mid anterior wall and entire apex.  Thinning and hypo to akinesis of the basal to mid inferolateral wall.  [place] is partially aneurysmal.  No LV thrombus appreciated.  Mild mitral regurgitation.  Normal estimated right atrial  pressure, unable to estimate RVSP.    Impressions:  61-year-old male patient with diabetes mellitus, obesity, ischemic cardiomyopathy, multivessel coronary artery disease s/p PCI of ramus, left anterior descending artery/diagonal    Recommendations:  On good medical therapy with dual antiplatelet therapy,, lisinopril, carvedilol.  Stable for discharge.  Outpatient follow-up will be arranged.  Needs to adjust diabetes meds changes outpatient.    Discussed with primary physician and bedside nursing.    Do not hesitate to call me with questions regarding the patient care.    Aniket Roman  Interventional cardiologist  Cell: [phone]

## 2022-03-29 ENCOUNTER — TELEPHONE (OUTPATIENT)
Dept: CARDIOLOGY | Facility: MEDICAL CENTER | Age: 62
End: 2022-03-29
Payer: COMMERCIAL

## 2022-03-29 NOTE — TELEPHONE ENCOUNTER
SW (ADD)      Pt called in and stated that the rehab facility at [facility] is needing a referral to set up the pt for Cardiac rehab for 12 weeks.     Pt also wanted to make sure the referral has the correct spelling of the pt's name. On the chart, the pt's first name is spelled incorrectly. The correct spelling of the pt's full name is Michael Hays.     Best contact for Rehab Supervisor at [facility]- Claudia Beaver  PH: [phone]  Fax: [phone]      Thank you,  Judy GUADARRAMA

## 2022-03-30 NOTE — TELEPHONE ENCOUNTER
Upon chart review referral placed by TW.   Routed to Marija in authorizations for referral to go to requested facility.

## 2022-04-13 ENCOUNTER — OFFICE VISIT (OUTPATIENT)
Dept: CARDIOLOGY | Facility: MEDICAL CENTER | Age: 62
End: 2022-04-13
Payer: COMMERCIAL

## 2022-04-13 VITALS
DIASTOLIC BLOOD PRESSURE: 62 MMHG | HEIGHT: 70 IN | WEIGHT: 251 LBS | HEART RATE: 82 BPM | SYSTOLIC BLOOD PRESSURE: 104 MMHG | RESPIRATION RATE: 16 BRPM | BODY MASS INDEX: 35.93 KG/M2 | OXYGEN SATURATION: 98 %

## 2022-04-13 DIAGNOSIS — E78.5 DYSLIPIDEMIA: ICD-10-CM

## 2022-04-13 DIAGNOSIS — I50.20 ACC/AHA STAGE B SYSTOLIC HEART FAILURE DUE TO ISCHEMIC CARDIOMYOPATHY (HCC): ICD-10-CM

## 2022-04-13 DIAGNOSIS — I25.5 ACC/AHA STAGE B SYSTOLIC HEART FAILURE DUE TO ISCHEMIC CARDIOMYOPATHY (HCC): ICD-10-CM

## 2022-04-13 DIAGNOSIS — Z79.899 HIGH RISK MEDICATION USE: ICD-10-CM

## 2022-04-13 DIAGNOSIS — I25.10 CORONARY ARTERY DISEASE DUE TO LIPID RICH PLAQUE: ICD-10-CM

## 2022-04-13 DIAGNOSIS — I42.0 DILATED CARDIOMYOPATHY (HCC): ICD-10-CM

## 2022-04-13 DIAGNOSIS — I25.83 CORONARY ARTERY DISEASE DUE TO LIPID RICH PLAQUE: ICD-10-CM

## 2022-04-13 DIAGNOSIS — I50.21 ACUTE SYSTOLIC HEART FAILURE (HCC): ICD-10-CM

## 2022-04-13 PROCEDURE — 99214 OFFICE O/P EST MOD 30 MIN: CPT | Performed by: NURSE PRACTITIONER

## 2022-04-13 RX ORDER — CARVEDILOL 6.25 MG/1
6.25 TABLET ORAL 2 TIMES DAILY WITH MEALS
Qty: 180 TABLET | Refills: 3 | Status: SHIPPED | OUTPATIENT
Start: 2022-04-13 | End: 2023-05-17

## 2022-04-13 RX ORDER — ASPIRIN 81 MG/1
81 TABLET ORAL DAILY
Qty: 100 TABLET | Refills: 3 | Status: SHIPPED | OUTPATIENT
Start: 2022-04-13 | End: 2022-05-13

## 2022-04-13 RX ORDER — DAPAGLIFLOZIN 10 MG/1
10 TABLET, FILM COATED ORAL DAILY
Qty: 30 TABLET | Refills: 11 | Status: SHIPPED | OUTPATIENT
Start: 2022-04-13

## 2022-04-13 RX ORDER — PRASUGREL 10 MG/1
10 TABLET, FILM COATED ORAL DAILY
Qty: 100 TABLET | Refills: 3 | Status: SHIPPED | OUTPATIENT
Start: 2022-04-13 | End: 2023-05-17

## 2022-04-13 RX ORDER — LISINOPRIL 5 MG/1
5 TABLET ORAL DAILY
Qty: 100 TABLET | Refills: 3 | Status: SHIPPED | OUTPATIENT
Start: 2022-04-13 | End: 2022-05-13

## 2022-04-13 ASSESSMENT — MINNESOTA LIVING WITH HEART FAILURE QUESTIONNAIRE (MLHF)
DIFFICULTY WITH SEXUAL ACTIVITIES: 0
MAKING YOU WORRY: 0
DIFFICULTY TO CONCENTRATE OR REMEMBERING THINGS: 0
WALKING ABOUT OR CLIMBING STAIRS DIFFICULT: 0
MAKING YOU FEEL DEPRESSED: 0
FEELING LIKE A BURDEN TO FAMILY AND FRIENDS: 0
DIFFICULTY SOCIALIZING WITH FAMILY OR FRIENDS: 0
COSTING YOU MONEY FOR MEDICAL CARE: 0
EATING LESS FOODS YOU LIKE: 3
DIFFICULTY WORKING TO EARN A LIVING: 5
SWELLING IN ANKLES OR LEGS: 0
DIFFICULTY WITH RECREATIONAL PASTIMES, SPORTS, HOBBIES: 3
HAVING TO SIT OR LIE DOWN DURING THE DAY: 0
MAKING YOU SHORT OF BREATH: 0
MAKING YOU STAY IN A HOSPITAL: 0
TOTAL_SCORE: 11
LOSS OF SELF CONTROL IN YOUR LIFE: 0
DIFFICULTY SLEEPING WELL AT NIGHT: 0
TIRED, FATIGUED OR LOW ON ENERGY: 0
DIFFICULTY GOING AWAY FROM HOME: 0
GIVING YOU SIDE EFFECTS FROM TREATMENTS: 0
WORKING AROUND THE HOUSE OR YARD DIFFICULT: 0

## 2022-04-13 ASSESSMENT — FIBROSIS 4 INDEX: FIB4 SCORE: 1.3

## 2022-04-13 NOTE — LETTER
April 13, 2022        Makayla Hays  [address] [place] [address]        Dear Makayla and to whom it may concern:    We have received Michael's [facility] paperwork and are in the process of completing. This paperwork will take 10-14 days to be processed and completed. Thank you for your understanding.     If you have any questions or concerns, please don't hesitate to call.        Sincerely,        MELLO Reeder.P.R.N.    Electronically Signed

## 2022-04-13 NOTE — PROGRESS NOTES
Chief Complaint   Patient presents with   • Dyslipidemia   • Congestive Heart Failure       Subjective     Michael Hays is a 61 y.o. male who presents today for recent hospitalization for NSTEMI and reduced EF on 3/24/2022.  Patient s/p PCI of ramus intermedius, LAD and D1.   left circumflex.    In addition to above patient has following medical problems of hyperlipidemia and T2DM.  Patient history of smoking, recently quit.    Today, patient feels well, denies chest pain, shortness of breath, palpitations, dizziness/lightheadedness, orthopnea, PND or Edema.  Patient reports he is walking a quarter mile every day.  He is scheduled for cardiac rehab at [facility] once he is cleared.  Blood pressures at home have been stable 100-110/60-70s.  Home weights have been stable 244 to 246 pounds.    Based on physical examination findings, patient is euvolemic. No JVD, lungs are clear to auscultation, no pitting edema in bilateral lower extremities, no ascites. Dry weight is 245 lbs. right radial site clean dry and intact with no erythema, swelling, or bruising.    Medication recommendations per below.  We will also initiate Farxiga for diabetes mellitus and HFrEF recommend SGLT 2 inhibitor for synergistic diuretic effect, diabetes control and attenuation of myocardial oxidative stress and fibrosis and to reduce the risk of major adverse cardiovascular events (cardiovascular death, non-fatal MI or non-fatal stroke) and to reduce the kathleen of cardiovascular death and hospitalization for heart failure in adults with heart failure with NYHA class II-IV. Follow up BMP in 2 weeks for high risk medication use.    Past Medical History:   Diagnosis Date   • Cataract    • Diabetes (HCC)    • Indigestion      History reviewed. No pertinent surgical history.  History reviewed. No pertinent family history.  Social History     Socioeconomic History   • Marital status:      Spouse name: Not on file   • Number of  children: Not on file   • Years of education: Not on file   • Highest education level: Not on file   Occupational History   • Not on file   Tobacco Use   • Smoking status: Never Smoker   • Smokeless tobacco: Never Used   Vaping Use   • Vaping Use: Never used   Substance and Sexual Activity   • Alcohol use: Not on file   • Drug use: Never   • Sexual activity: Not on file   Other Topics Concern   • Not on file   Social History Narrative   • Not on file     Social Determinants of Health     Financial Resource Strain: Not on file   Food Insecurity: Not on file   Transportation Needs: Not on file   Physical Activity: Not on file   Stress: Not on file   Social Connections: Not on file   Intimate Partner Violence: Not on file   Housing Stability: Not on file     No Known Allergies  Outpatient Encounter Medications as of 4/13/2022   Medication Sig Dispense Refill   • aspirin 81 MG EC tablet Take 1 Tablet by mouth every day for 30 days. 100 Tablet 3   • lisinopril (PRINIVIL) 5 MG Tab Take 1 Tablet by mouth every day for 30 days. 100 Tablet 3   • prasugrel (EFFIENT) 10 MG Tab Take 1 Tablet by mouth every day for 30 days. 100 Tablet 3   • Dapagliflozin Propanediol 10 MG Tab Take 10 mg by mouth every day. 30 Tablet 11   • carvedilol (COREG) 6.25 MG Tab Take 1 Tablet by mouth 2 times a day with meals for 30 days. 180 Tablet 3   • GLYBURIDE MICRONIZED PO Take 5 mg by mouth 3 times a day with meals.     • metformin (GLUCOPHAGE) 1000 MG tablet Take 1,000 mg by mouth 2 times a day with meals.     • atorvastatin (LIPITOR) 40 MG Tab Take 40 mg by mouth every evening.     • [DISCONTINUED] aspirin 81 MG EC tablet Take 1 Tablet by mouth every day for 30 days. 30 Tablet 0   • [DISCONTINUED] carvedilol (COREG) 6.25 MG Tab Take 1 Tablet by mouth 2 times a day with meals for 30 days. 60 Tablet 0   • [DISCONTINUED] lisinopril (PRINIVIL) 5 MG Tab Take 1 Tablet by mouth every day for 30 days. 30 Tablet 0   • [DISCONTINUED] prasugrel (EFFIENT)  "10 MG Tab Take 1 Tablet by mouth every day for 30 days. 30 Tablet 0     No facility-administered encounter medications on file as of 4/13/2022.     Review of Systems   Constitutional: Negative for fever, malaise/fatigue and weight loss.   Eyes: Negative for blurred vision.   Respiratory: Positive for shortness of breath (TALBOT; NYHA II). Negative for cough.    Cardiovascular: Negative for chest pain, palpitations, orthopnea, claudication, leg swelling and PND.   Gastrointestinal: Negative for abdominal pain, blood in stool, nausea and vomiting.   Genitourinary: Negative for dysuria, frequency and hematuria.   Musculoskeletal: Negative for falls and myalgias.   Neurological: Negative for dizziness, tingling and loss of consciousness.   Endo/Heme/Allergies: Does not bruise/bleed easily.              Objective     /62 (BP Location: Left arm, Patient Position: Sitting, BP Cuff Size: Adult)   Pulse 82   Resp 16   Ht 1.778 m (5' 10\")   Wt 114 kg (251 lb)   SpO2 98%   BMI 36.01 kg/m²     Physical Exam  Vitals reviewed.   Constitutional:       Appearance: He is well-developed.   HENT:      Head: Normocephalic and atraumatic.   Eyes:      Pupils: Pupils are equal, round, and reactive to light.   Neck:      Vascular: No JVD.   Cardiovascular:      Rate and Rhythm: Normal rate and regular rhythm.      Heart sounds: Normal heart sounds. No murmur heard.    No friction rub. No gallop.   Pulmonary:      Effort: Pulmonary effort is normal. No respiratory distress.      Breath sounds: Normal breath sounds.   Abdominal:      General: Bowel sounds are normal. There is no distension.      Palpations: Abdomen is soft.   Musculoskeletal:      Right lower leg: No edema.      Left lower leg: No edema.   Skin:     General: Skin is warm and dry.      Findings: No erythema.      Comments: Right radial site CDI, no erythema, swelling, or bruising   Neurological:      Mental Status: He is alert and oriented to person, place, and " time.   Psychiatric:         Behavior: Behavior normal.            Lab Results   Component Value Date/Time    CHOLSTRLTOT 104 03/24/2022 04:06 AM    LDL 45 03/24/2022 04:06 AM    HDL 40 03/24/2022 04:06 AM    TRIGLYCERIDE 94 03/24/2022 04:06 AM       Lab Results   Component Value Date/Time    SODIUM 138 03/28/2022 02:47 AM    POTASSIUM 4.0 03/28/2022 02:47 AM    CHLORIDE 107 03/28/2022 02:47 AM    CO2 20 03/28/2022 02:47 AM    GLUCOSE 126 (H) 03/28/2022 02:47 AM    BUN 9 03/28/2022 02:47 AM    CREATININE 0.63 03/28/2022 02:47 AM     Lab Results   Component Value Date/Time    ALKPHOSPHAT 97 03/25/2022 01:29 AM    ASTSGOT 17 03/25/2022 01:29 AM    ALTSGPT 17 03/25/2022 01:29 AM    TBILIRUBIN 0.3 03/25/2022 01:29 AM      [facility] (3/27/2022):  HEMODYNAMICS:  1. Aortic pressure: 92/63/76  mmHg  CORONARY ANGIOGRAPHY:  1. The left main coronary artery: Well-appearing, trifurcates to an LAD, ramus intermedius and circumflex.  2. Ramus intermedius coronary artery: Severe 95 to 99% proximal stenosis.  3. The left anterior descending coronary artery: Severe 99% stenosis in the mid LAD right at the takeoff of the diagonal branch. Severe 95-99% stenosis in the ostial and mid diagonal branch.  4. The left circumflex coronary artery: Normal appearing except for 100% occlusion of one of the branches of a large OM artery.  The true circumflex tapers into the AV groove  5. The right coronary artery: Dominant, mild 30% proximal stenosis, severe 95-99% diffuse disease in the PL branches.  IMPRESSION:  1.  Severe three-vessel disease including proximal-mid LAD /mid diagonal, proximal ramus intermedius artery and large in caliber obtuse marginal artery.  Diffuse severe disease in the PL branches.  2.  High-normal resting LVEDP with no significant transaortic gradient on pullback.  RECOMMENDATIONS:  1. Appreciate the surgical consultation for CABG evaluation.   2. Resume heparin drip pending revascularization  strategy.    ECHO(3/24/22):   CONCLUSIONS  Fair quality study, improved with contrast.  The left ventricular ejection fraction is visually estimated to be 30-  35%, moderately reduced.  Hypokinesis to akinesis of the mid anterior wall and entire apex.  Thinning and hypo to akinesis of the basal to mid inferolateral wall.  [place] is partially aneurysmal.  No LV thrombus appreciated.  Mild mitral regurgitation.  Normal estimated right atrial pressure, unable to estimate RVSP.    [facility] (3/27/2022):   INDICATION/PREOPERATIVE DIAGNOSIS:  1. Cardiomyopathy  2. Multivessel coronary artery disease     POSTOPERATIVE DIAGNOSIS:  1. Successful PCI ramus intermedius (2.25 x 22 mm Baljinder CHELE), excellent result  2. Successful complex bifurcation PCI LAD and D1, excellent result  3. Residual  LCx and small vessel distal RCA disease     RECOMMENDATIONS:  Guideline directed medical therapy   Cardiovascular Risk factor modification  Dual antiplatelet therapy for minimum of 1 year lifelong as tolerated     PROCEDURES PERFORMED:  · Left heart catheterization   · Supervision moderate sedation  · Percutaneous coronary intervention ramus intermedius  · Continue current intervention LAD and diagonal  FINDINGS:  I.  HEMODYNAMICS              Ao: 81/58 mmHg              LEDP: 12 mmHg              Gradient on LV pullback: No    Assessment & Plan     1. ACC/AHA stage B systolic heart failure due to ischemic cardiomyopathy (HCC)  Basic Metabolic Panel   2. Dyslipidemia  Basic Metabolic Panel   3. Dilated cardiomyopathy (HCC)  aspirin 81 MG EC tablet    lisinopril (PRINIVIL) 5 MG Tab    prasugrel (EFFIENT) 10 MG Tab    carvedilol (COREG) 6.25 MG Tab    Basic Metabolic Panel   4. Acute systolic heart failure (HCC)  aspirin 81 MG EC tablet    lisinopril (PRINIVIL) 5 MG Tab    prasugrel (EFFIENT) 10 MG Tab    carvedilol (COREG) 6.25 MG Tab    Basic Metabolic Panel   5. Coronary artery disease due to lipid rich plaque  aspirin 81 MG EC tablet     lisinopril (PRINIVIL) 5 MG Tab    prasugrel (EFFIENT) 10 MG Tab    carvedilol (COREG) 6.25 MG Tab    Basic Metabolic Panel   6. High risk medication use         Medical Decision Making: Today's Assessment/Status/Plan:        HFrEF, Stage B, Class II, LVEF 30-35%: Euvolemic on exam  -Heart failure due to ischemic cardiomyopathy  -ACE-I/ARB/ARNI: lisinopril 5 mg daily  -Evidence Based Beta-blocker: coreg 6.25 mg BID  -Aldosterone Antagonist: consider at next appointment  -SGLT2-I: initiate farxiga 10 mg daily  -Diuretic: euvolemic on exam  -Labs: BMP in 2 weeks  -Repeat Echo in 3 months, if LVEF not >35%, then will discuss/consider ICD for primary Prevention.   -Reinforced s/sx of worsening heart failure with patient and weight monitoring. Pt verbalizes understanding. Pt to call office if present.    -Heart Failure Education: pt to be contacted by HF nurse for further education  -Advanced care planning: Advanced directive and POLST discuss at next follow-up    CAD, NSTEMI, s/p CHELE/PCI to ramus intermedius, LAD and D1.   left circumflex.: Continue DAPT x 1 year; HLD  -Continue aspirin 81 mg daily  -Continue effient 10 mg daily  -Continue high intensity statin at atorvastatin 40 mg daily  -LDL 45. At goal  -Cardiac rehab at [facility]  -Discussed worsening symptoms of chest pain, patient to go the emergency room    T2DM  -A1C 7.4  -metformin  -Add farxiga for HF  -per PCP    FU in clinic in 2 weeks with Dr. Fuentes. Sooner if needed.    Patient verbalizes understanding and agrees with the plan of care.     I personally spent a total of 31 minutes which includes face-to-face time and non-face-to-face time spent on preparing to see the patient, reviewing prior notes and tests, obtaining history from the patient, performing a medically appropriate exam, counseling and educating the patient, ordering medications/tests/procedures/referrals as clinically indicated, and documenting information in the electronic  medical record.    LIA Reeder.   [facility]  [phone]    PLEASE NOTE: This Note was created using voice recognition Software. I have made every reasonable attempt to correct obvious errors, but I expect that there are errors of grammar and possibly content that I did not discover before finalizing the note

## 2022-04-18 ASSESSMENT — ENCOUNTER SYMPTOMS
PND: 0
VOMITING: 0
ORTHOPNEA: 0
TINGLING: 0
LOSS OF CONSCIOUSNESS: 0
DIZZINESS: 0
CLAUDICATION: 0
BLURRED VISION: 0
MYALGIAS: 0
NAUSEA: 0
ABDOMINAL PAIN: 0
BRUISES/BLEEDS EASILY: 0
PALPITATIONS: 0
BLOOD IN STOOL: 0
FALLS: 0
WEIGHT LOSS: 0
SHORTNESS OF BREATH: 1
COUGH: 0
FEVER: 0

## 2022-04-25 ENCOUNTER — TELEPHONE (OUTPATIENT)
Dept: CARDIOLOGY | Facility: MEDICAL CENTER | Age: 62
End: 2022-04-25
Payer: COMMERCIAL

## 2022-04-25 NOTE — TELEPHONE ENCOUNTER
S/W pt, aware of [facility] paperwork being completed and faxed today. Asked for a copy to be sent to him via email. Aware it is coming from the fax.

## 2022-04-25 NOTE — TELEPHONE ENCOUNTER
Michael Ochoa would like a call back, to go over the paperwork that was sent to him.   Michael- [phone]    Thank you,   Kassnadra CM

## 2022-04-25 NOTE — TELEPHONE ENCOUNTER
S/W pt, hospitalist provided letter stating to be off of work until 5/9/22- no letter recorded in chart. Per JG ok to have FMLA state off until 5/9/22

## 2022-04-25 NOTE — TELEPHONE ENCOUNTER
ANGELINE    Pt left paperwork to be filled in and has not heard back on the status. Please reach out to him @ PH: [phone]    Thank You  Ro CROOK

## 2022-04-28 ENCOUNTER — OFFICE VISIT (OUTPATIENT)
Dept: CARDIOLOGY | Facility: MEDICAL CENTER | Age: 62
End: 2022-04-28
Payer: COMMERCIAL

## 2022-04-28 VITALS
OXYGEN SATURATION: 96 % | DIASTOLIC BLOOD PRESSURE: 62 MMHG | HEIGHT: 70 IN | BODY MASS INDEX: 35.5 KG/M2 | RESPIRATION RATE: 16 BRPM | HEART RATE: 91 BPM | SYSTOLIC BLOOD PRESSURE: 100 MMHG | WEIGHT: 248 LBS

## 2022-04-28 DIAGNOSIS — I25.10 CORONARY ARTERY DISEASE INVOLVING NATIVE CORONARY ARTERY OF NATIVE HEART WITHOUT ANGINA PECTORIS: ICD-10-CM

## 2022-04-28 DIAGNOSIS — I50.20 ACC/AHA STAGE B SYSTOLIC HEART FAILURE DUE TO ISCHEMIC CARDIOMYOPATHY (HCC): ICD-10-CM

## 2022-04-28 DIAGNOSIS — E78.5 DYSLIPIDEMIA: ICD-10-CM

## 2022-04-28 DIAGNOSIS — I25.5 ACC/AHA STAGE B SYSTOLIC HEART FAILURE DUE TO ISCHEMIC CARDIOMYOPATHY (HCC): ICD-10-CM

## 2022-04-28 PROCEDURE — 99214 OFFICE O/P EST MOD 30 MIN: CPT | Performed by: NURSE PRACTITIONER

## 2022-04-28 RX ORDER — INSULIN GLARGINE-YFGN 100 [IU]/ML
24 INJECTION, SOLUTION SUBCUTANEOUS DAILY
COMMUNITY
Start: 2022-03-25

## 2022-04-28 RX ORDER — CARISOPRODOL 350 MG/1
350 TABLET ORAL
COMMUNITY
Start: 2022-03-24

## 2022-04-28 RX ORDER — PEN NEEDLE, DIABETIC 32GX 5/32"
NEEDLE, DISPOSABLE MISCELLANEOUS
COMMUNITY
Start: 2022-04-15

## 2022-04-28 RX ORDER — GLYBURIDE 5 MG/1
5 TABLET ORAL 3 TIMES DAILY
COMMUNITY
Start: 2022-03-30

## 2022-04-28 ASSESSMENT — ENCOUNTER SYMPTOMS
HEADACHES: 0
PALPITATIONS: 0
WHEEZING: 0
SHORTNESS OF BREATH: 0
DOUBLE VISION: 0
COUGH: 0
LOSS OF CONSCIOUSNESS: 0
DIZZINESS: 0
NERVOUS/ANXIOUS: 0
FOCAL WEAKNESS: 0
BLURRED VISION: 0
WEAKNESS: 0
FALLS: 0
ORTHOPNEA: 0

## 2022-04-28 ASSESSMENT — FIBROSIS 4 INDEX: FIB4 SCORE: 1.3

## 2022-04-28 NOTE — LETTER
[facility]   [address], [address]  [place] [address]  Phone: [phone]  Fax: [phone]              Michael Hays  1960    Encounter Date: 2022    MAK Cowan          PROGRESS NOTE:  Chief Complaint   Patient presents with   • Abnormal EKG   • CHF (Systolic)     F/V Dx: Acute systolic heart failure (HCC     • Cardiomyopathy (Ischemic)     F/V Dx: ACC/AHA stage B systolic heart failure due to ischemic cardiomyopathy (HCC)       Subjective     Michael Hays is a 61 y.o. male who presents today for follow up CAD.    Past medical history of hyperlipidemia, type 2 diabetes, and obesity.    Patient was hospital lysed for exertional dyspnea showed some ST elevation in leads II.  V6.  Stress test was positive echo showed reduced EF of 35%.  Patient underwent left heart cath which showed severe three-vessel disease.  CTS evaluated patient due to the left ventricular apical aneurysm and a cardiac MRI or YESI underwent coronary angiogram on , s/p PCI of ramus, left anterior descending artery/diagonal.    Patient has started cardiac rehab and feeling much better. He has been walking around his big acre of land. Tolerating well. Denies any chest pain, sob, dizziness, edema or palpitations     Past Medical History:   Diagnosis Date   • Cataract    • Diabetes (HCC)    • Indigestion      History reviewed. No pertinent surgical history.  History reviewed. No pertinent family history.  Social History     Socioeconomic History   • Marital status:      Spouse name: Not on file   • Number of children: Not on file   • Years of education: Not on file   • Highest education level: Not on file   Occupational History   • Not on file   Tobacco Use   • Smoking status: Former Smoker     Types: Cigarettes     Quit date: 3/23/2022     Years since quittin.0   • Smokeless tobacco: Never Used   Vaping Use   • Vaping Use: Never used   Substance and Sexual  Activity   • Alcohol use: Not on file   • Drug use: Never   • Sexual activity: Not on file   Other Topics Concern   • Not on file   Social History Narrative   • Not on file     Social Determinants of Health     Financial Resource Strain: Not on file   Food Insecurity: Not on file   Transportation Needs: Not on file   Physical Activity: Not on file   Stress: Not on file   Social Connections: Not on file   Intimate Partner Violence: Not on file   Housing Stability: Not on file     No Known Allergies  Outpatient Encounter Medications as of 4/28/2022   Medication Sig Dispense Refill   • carisoprodol (SOMA) 350 MG Tab Take 350 mg by mouth.     • SEMGLEE, YFGN, 100 UNIT/ML Solution Pen-injector Inject 24 Units as directed every day.     • BD PEN NEEDLE ANDREW U/F      • aspirin 81 MG EC tablet Take 1 Tablet by mouth every day for 30 days. 100 Tablet 3   • lisinopril (PRINIVIL) 5 MG Tab Take 1 Tablet by mouth every day for 30 days. 100 Tablet 3   • prasugrel (EFFIENT) 10 MG Tab Take 1 Tablet by mouth every day for 30 days. 100 Tablet 3   • Dapagliflozin Propanediol 10 MG Tab Take 10 mg by mouth every day. 30 Tablet 11   • carvedilol (COREG) 6.25 MG Tab Take 1 Tablet by mouth 2 times a day with meals for 30 days. 180 Tablet 3   • metformin (GLUCOPHAGE) 1000 MG tablet Take 1,000 mg by mouth 2 times a day with meals.     • atorvastatin (LIPITOR) 40 MG Tab Take 40 mg by mouth every evening.     • glyBURIDE (DIABETA) 5 MG Tab Take 5 mg by mouth in the morning, at noon, and at bedtime.     • [DISCONTINUED] GLYBURIDE MICRONIZED PO Take 5 mg by mouth 3 times a day with meals.       No facility-administered encounter medications on file as of 4/28/2022.     Review of Systems   Constitutional: Negative for malaise/fatigue.   Eyes: Negative for blurred vision and double vision.   Respiratory: Negative for cough, shortness of breath and wheezing.    Cardiovascular: Negative for chest pain, palpitations, orthopnea and leg swelling.  "  Musculoskeletal: Negative for falls.   Neurological: Negative for dizziness, focal weakness, loss of consciousness, weakness and headaches.   Psychiatric/Behavioral: The patient is not nervous/anxious.    All other systems reviewed and are negative.             Objective     /62 (BP Location: Left arm, Patient Position: Sitting, BP Cuff Size: Adult)   Pulse 91   Resp 16   Ht 1.778 m (5' 10\")   Wt 112 kg (248 lb)   SpO2 96%   BMI 35.58 kg/m²     Physical Exam  Constitutional:       General: He is not in acute distress.     Appearance: He is well-developed. He is not diaphoretic.   HENT:      Head: Normocephalic and atraumatic.   Eyes:      Pupils: Pupils are equal, round, and reactive to light.   Neck:      Vascular: No JVD.   Cardiovascular:      Rate and Rhythm: Normal rate and regular rhythm.      Heart sounds: Normal heart sounds.   Pulmonary:      Effort: Pulmonary effort is normal.      Breath sounds: Normal breath sounds.   Abdominal:      General: Bowel sounds are normal. There is no distension.      Palpations: Abdomen is soft.   Skin:     General: Skin is warm and dry.   Neurological:      Mental Status: He is alert and oriented to person, place, and time.   Psychiatric:         Behavior: Behavior normal.         Thought Content: Thought content normal.         Judgment: Judgment normal.              Coronary angiogram   3/27/2022  INDICATION/PREOPERATIVE DIAGNOSIS:  1. Cardiomyopathy  2. Multivessel coronary artery disease     POSTOPERATIVE DIAGNOSIS:  1. Successful PCI ramus intermedius (2.25 x 22 mm Baljinder CHELE), excellent result  2. Successful complex bifurcation PCI LAD and D1, excellent result  3. Residual  LCx and small vessel distal RCA disease     RECOMMENDATIONS:  Guideline directed medical therapy   Cardiovascular Risk factor modification  Dual antiplatelet therapy for minimum of 1 year lifelong as tolerated    ECHO(3/24/22):     CONCLUSIONS  Fair quality study, improved with " contrast.  The left ventricular ejection fraction is visually estimated to be 30-  35%, moderately reduced.  Hypokinesis to akinesis of the mid anterior wall and entire apex.  Thinning and hypo to akinesis of the basal to mid inferolateral wall.  [place] is partially aneurysmal.  No LV thrombus appreciated.  Mild mitral regurgitation.  Normal estimated right atrial pressure, unable to estimate RVSP.      Coronary angiogram   3/25/2022  CORONARY ANGIOGRAPHY:  1. The left main coronary artery: Well-appearing, trifurcates to an LAD, ramus intermedius and circumflex.  2. Ramus intermedius coronary artery: Severe 95 to 99% proximal stenosis.  3. The left anterior descending coronary artery: Severe 99% stenosis in the mid LAD right at the takeoff of the diagonal branch. Severe 95-99% stenosis in the ostial and mid diagonal branch.  4. The left circumflex coronary artery: Normal appearing except for 100% occlusion of one of the branches of a large OM artery.  The true circumflex tapers into the AV groove  5. The right coronary artery: Dominant, mild 30% proximal stenosis, severe 95-99% diffuse disease in the PL branches.     IMPRESSION:  1.  Severe three-vessel disease including proximal-mid LAD /mid diagonal, proximal ramus intermedius artery and large in caliber obtuse marginal artery.  Diffuse severe disease in the PL branches.  2.  High-normal resting LVEDP with no significant transaortic gradient on pullback.     RECOMMENDATIONS:  1. Appreciate the surgical consultation for CABG evaluation.   2. Resume heparin drip pending revascularization strategy.    Assessment & Plan     1. Coronary artery disease involving native coronary artery of native heart without angina pectoris     2. ACC/AHA stage B systolic heart failure due to ischemic cardiomyopathy (HCC)  EC-ECHOCARDIOGRAM COMPLETE W/O CONT   3. Dyslipidemia         Medical Decision Making: Today's Assessment/Status/Plan:         1. HFrEF, Stage B, Class II, LVEF  30-35%:  - appears Euvolemic on exam  -Heart failure due to ischemic cardiomyopathy  -ACE-I/ARB/ARNI: lisinopril 5 mg daily  -Evidence Based Beta-blocker: coreg 6.25 mg BID  -Aldosterone Antagonist: holding due to borderline bp   -SGLT2-I: continue farxiga 10 mg daily  -Diuretic: euvolemic on exam  -Labs: BMP pending   -Repeat Echo in 3 months, if LVEF not >35%, then will discuss/consider ICD for primary Prevention.   -Reinforced s/sx of worsening heart failure with patient and weight monitoring. Pt verbalizes understanding. Pt to call office if present.         2. CAD, NSTEMI, s/p CHELE/PCI to ramus intermedius, LAD and D1.   left circumflex.:   -Continue aspirin 81 mg daily  -Continue effient 10 mg daily  -Continue high intensity statin at atorvastatin 40 mg daily  -LDL 45. At goal  -Cardiac rehab at [facility]      Follow up after ECHO with Dr. Blackmon, sooner as needed.                       Landry Donaldson M.D.  [address] [place] [id number]  Via Fax: [phone]

## 2022-04-28 NOTE — PROGRESS NOTES
Chief Complaint   Patient presents with   • Abnormal EKG   • CHF (Systolic)     F/V Dx: Acute systolic heart failure (HCC     • Cardiomyopathy (Ischemic)     F/V Dx: ACC/AHA stage B systolic heart failure due to ischemic cardiomyopathy (HCC)       Lizette Hays is a 61 y.o. male who presents today for follow up CAD.    Past medical history of hyperlipidemia, type 2 diabetes, and obesity.    Patient was hospital lysed for exertional dyspnea showed some ST elevation in leads II.  V6.  Stress test was positive echo showed reduced EF of 35%.  Patient underwent left heart cath which showed severe three-vessel disease.  CTS evaluated patient due to the left ventricular apical aneurysm and a cardiac MRI or YESI underwent coronary angiogram on , s/p PCI of ramus, left anterior descending artery/diagonal.    Patient has started cardiac rehab and feeling much better. He has been walking around his big acre of land. Tolerating well. Denies any chest pain, sob, dizziness, edema or palpitations     Past Medical History:   Diagnosis Date   • Cataract    • Diabetes (HCC)    • Indigestion      History reviewed. No pertinent surgical history.  History reviewed. No pertinent family history.  Social History     Socioeconomic History   • Marital status:      Spouse name: Not on file   • Number of children: Not on file   • Years of education: Not on file   • Highest education level: Not on file   Occupational History   • Not on file   Tobacco Use   • Smoking status: Former Smoker     Types: Cigarettes     Quit date: 3/23/2022     Years since quittin.0   • Smokeless tobacco: Never Used   Vaping Use   • Vaping Use: Never used   Substance and Sexual Activity   • Alcohol use: Not on file   • Drug use: Never   • Sexual activity: Not on file   Other Topics Concern   • Not on file   Social History Narrative   • Not on file     Social Determinants of Health     Financial Resource Strain: Not on file   Food  Insecurity: Not on file   Transportation Needs: Not on file   Physical Activity: Not on file   Stress: Not on file   Social Connections: Not on file   Intimate Partner Violence: Not on file   Housing Stability: Not on file     No Known Allergies  Outpatient Encounter Medications as of 4/28/2022   Medication Sig Dispense Refill   • carisoprodol (SOMA) 350 MG Tab Take 350 mg by mouth.     • SEMGLEE, YFGN, 100 UNIT/ML Solution Pen-injector Inject 24 Units as directed every day.     • BD PEN NEEDLE ANDREW U/F      • aspirin 81 MG EC tablet Take 1 Tablet by mouth every day for 30 days. 100 Tablet 3   • lisinopril (PRINIVIL) 5 MG Tab Take 1 Tablet by mouth every day for 30 days. 100 Tablet 3   • prasugrel (EFFIENT) 10 MG Tab Take 1 Tablet by mouth every day for 30 days. 100 Tablet 3   • Dapagliflozin Propanediol 10 MG Tab Take 10 mg by mouth every day. 30 Tablet 11   • carvedilol (COREG) 6.25 MG Tab Take 1 Tablet by mouth 2 times a day with meals for 30 days. 180 Tablet 3   • metformin (GLUCOPHAGE) 1000 MG tablet Take 1,000 mg by mouth 2 times a day with meals.     • atorvastatin (LIPITOR) 40 MG Tab Take 40 mg by mouth every evening.     • glyBURIDE (DIABETA) 5 MG Tab Take 5 mg by mouth in the morning, at noon, and at bedtime.     • [DISCONTINUED] GLYBURIDE MICRONIZED PO Take 5 mg by mouth 3 times a day with meals.       No facility-administered encounter medications on file as of 4/28/2022.     Review of Systems   Constitutional: Negative for malaise/fatigue.   Eyes: Negative for blurred vision and double vision.   Respiratory: Negative for cough, shortness of breath and wheezing.    Cardiovascular: Negative for chest pain, palpitations, orthopnea and leg swelling.   Musculoskeletal: Negative for falls.   Neurological: Negative for dizziness, focal weakness, loss of consciousness, weakness and headaches.   Psychiatric/Behavioral: The patient is not nervous/anxious.    All other systems reviewed and are negative.        "      Objective     /62 (BP Location: Left arm, Patient Position: Sitting, BP Cuff Size: Adult)   Pulse 91   Resp 16   Ht 1.778 m (5' 10\")   Wt 112 kg (248 lb)   SpO2 96%   BMI 35.58 kg/m²     Physical Exam  Constitutional:       General: He is not in acute distress.     Appearance: He is well-developed. He is not diaphoretic.   HENT:      Head: Normocephalic and atraumatic.   Eyes:      Pupils: Pupils are equal, round, and reactive to light.   Neck:      Vascular: No JVD.   Cardiovascular:      Rate and Rhythm: Normal rate and regular rhythm.      Heart sounds: Normal heart sounds.   Pulmonary:      Effort: Pulmonary effort is normal.      Breath sounds: Normal breath sounds.   Abdominal:      General: Bowel sounds are normal. There is no distension.      Palpations: Abdomen is soft.   Skin:     General: Skin is warm and dry.   Neurological:      Mental Status: He is alert and oriented to person, place, and time.   Psychiatric:         Behavior: Behavior normal.         Thought Content: Thought content normal.         Judgment: Judgment normal.              Coronary angiogram   3/27/2022  INDICATION/PREOPERATIVE DIAGNOSIS:  1. Cardiomyopathy  2. Multivessel coronary artery disease     POSTOPERATIVE DIAGNOSIS:  1. Successful PCI ramus intermedius (2.25 x 22 mm Baljinder CHELE), excellent result  2. Successful complex bifurcation PCI LAD and D1, excellent result  3. Residual  LCx and small vessel distal RCA disease     RECOMMENDATIONS:  Guideline directed medical therapy   Cardiovascular Risk factor modification  Dual antiplatelet therapy for minimum of 1 year lifelong as tolerated    ECHO(3/24/22):     CONCLUSIONS  Fair quality study, improved with contrast.  The left ventricular ejection fraction is visually estimated to be 30-  35%, moderately reduced.  Hypokinesis to akinesis of the mid anterior wall and entire apex.  Thinning and hypo to akinesis of the basal to mid inferolateral wall.  [place] is partially " aneurysmal.  No LV thrombus appreciated.  Mild mitral regurgitation.  Normal estimated right atrial pressure, unable to estimate RVSP.      Coronary angiogram   3/25/2022  CORONARY ANGIOGRAPHY:  1. The left main coronary artery: Well-appearing, trifurcates to an LAD, ramus intermedius and circumflex.  2. Ramus intermedius coronary artery: Severe 95 to 99% proximal stenosis.  3. The left anterior descending coronary artery: Severe 99% stenosis in the mid LAD right at the takeoff of the diagonal branch. Severe 95-99% stenosis in the ostial and mid diagonal branch.  4. The left circumflex coronary artery: Normal appearing except for 100% occlusion of one of the branches of a large OM artery.  The true circumflex tapers into the AV groove  5. The right coronary artery: Dominant, mild 30% proximal stenosis, severe 95-99% diffuse disease in the PL branches.     IMPRESSION:  1.  Severe three-vessel disease including proximal-mid LAD /mid diagonal, proximal ramus intermedius artery and large in caliber obtuse marginal artery.  Diffuse severe disease in the PL branches.  2.  High-normal resting LVEDP with no significant transaortic gradient on pullback.     RECOMMENDATIONS:  1. Appreciate the surgical consultation for CABG evaluation.   2. Resume heparin drip pending revascularization strategy.    Assessment & Plan     1. Coronary artery disease involving native coronary artery of native heart without angina pectoris     2. ACC/AHA stage B systolic heart failure due to ischemic cardiomyopathy (HCC)  EC-ECHOCARDIOGRAM COMPLETE W/O CONT   3. Dyslipidemia         Medical Decision Making: Today's Assessment/Status/Plan:         1. HFrEF, Stage B, Class II, LVEF 30-35%:  - appears Euvolemic on exam  -Heart failure due to ischemic cardiomyopathy  -ACE-I/ARB/ARNI: lisinopril 5 mg daily  -Evidence Based Beta-blocker: coreg 6.25 mg BID  -Aldosterone Antagonist: holding due to borderline bp   -SGLT2-I: continue farxiga 10 mg  daily  -Diuretic: euvolemic on exam  -Labs: BMP pending   -Repeat Echo in 3 months, if LVEF not >35%, then will discuss/consider ICD for primary Prevention.   -Reinforced s/sx of worsening heart failure with patient and weight monitoring. Pt verbalizes understanding. Pt to call office if present.         2. CAD, NSTEMI, s/p CHELE/PCI to ramus intermedius, LAD and D1.   left circumflex.:   -Continue aspirin 81 mg daily  -Continue effient 10 mg daily  -Continue high intensity statin at atorvastatin 40 mg daily  -LDL 45. At goal  -Cardiac rehab at [facility]      Follow up after ECHO with Dr. Blackmon, sooner as needed.

## 2022-08-19 ENCOUNTER — TELEPHONE (OUTPATIENT)
Dept: SCHEDULING | Facility: IMAGING CENTER | Age: 62
End: 2022-08-19

## 2022-08-19 NOTE — TELEPHONE ENCOUNTER
Good Afternoon, PT. Called to confirm the provider he was seeing and I noticed the appointment was marcked for R/S but I do not see any notes stating why or that it was noted for R/S. There are also no available appointments available. Please reach out to PT to confirm or reschedule this upcoming cardio visit with DR Blackmon.  Thank You   -Maeve MCCLAIN  Specialty Scheduling

## 2023-05-12 DIAGNOSIS — I25.10 CORONARY ARTERY DISEASE DUE TO LIPID RICH PLAQUE: ICD-10-CM

## 2023-05-12 DIAGNOSIS — I50.21 ACUTE SYSTOLIC HEART FAILURE (HCC): ICD-10-CM

## 2023-05-12 DIAGNOSIS — I42.0 DILATED CARDIOMYOPATHY (HCC): ICD-10-CM

## 2023-05-12 DIAGNOSIS — I25.83 CORONARY ARTERY DISEASE DUE TO LIPID RICH PLAQUE: ICD-10-CM

## 2023-05-12 NOTE — TELEPHONE ENCOUNTER
Is the patient due for a refill? Yes- courtesy refill    Was the patient seen the past year? No    Date of last office visit: 4/28/2022    Does the patient have an upcoming appointment?  No   If yes, When?     Provider to refill:MR    Does the patients insurance require a 100 day supply?  No

## 2023-05-17 RX ORDER — CARVEDILOL 6.25 MG/1
6.25 TABLET ORAL 2 TIMES DAILY WITH MEALS
Qty: 180 TABLET | Refills: 0 | Status: SHIPPED | OUTPATIENT
Start: 2023-05-17 | End: 2023-09-22 | Stop reason: SDUPTHER

## 2023-05-17 RX ORDER — PRASUGREL 10 MG/1
10 TABLET, FILM COATED ORAL
Qty: 90 TABLET | Refills: 0 | Status: SHIPPED | OUTPATIENT
Start: 2023-05-17

## 2023-05-17 NOTE — TELEPHONE ENCOUNTER
Patient last seen 4/2022. Patient needs FV and Echocardiogram per last OV note.   Courtesy refill sent. Patient needs FV for additional refills. New echo order placed.     [facility] Scheduling team: Please contact patient for follow up appointment. Thank you!

## 2023-06-09 ENCOUNTER — TELEPHONE (OUTPATIENT)
Dept: CARDIOLOGY | Facility: MEDICAL CENTER | Age: 63
End: 2023-06-09
Payer: COMMERCIAL

## 2023-08-07 ENCOUNTER — TELEPHONE (OUTPATIENT)
Dept: CARDIOLOGY | Facility: MEDICAL CENTER | Age: 63
End: 2023-08-07
Payer: COMMERCIAL

## 2023-08-07 NOTE — TELEPHONE ENCOUNTER
Lvm asking if he had the echo done. Waiting for response. Follow up appointment is with Dr. MUELLER on 8/22/23.  Echo is scheduled at [facility] on 8/28/23.

## 2023-09-21 DIAGNOSIS — I25.83 CORONARY ARTERY DISEASE DUE TO LIPID RICH PLAQUE: ICD-10-CM

## 2023-09-21 DIAGNOSIS — I50.21 ACUTE SYSTOLIC HEART FAILURE (HCC): ICD-10-CM

## 2023-09-21 DIAGNOSIS — I42.0 DILATED CARDIOMYOPATHY (HCC): ICD-10-CM

## 2023-09-21 DIAGNOSIS — I25.10 CORONARY ARTERY DISEASE DUE TO LIPID RICH PLAQUE: ICD-10-CM

## 2023-09-21 RX ORDER — CARVEDILOL 6.25 MG/1
TABLET ORAL
Qty: 60 TABLET | OUTPATIENT
Start: 2023-09-21

## 2023-09-22 RX ORDER — CARVEDILOL 6.25 MG/1
6.25 TABLET ORAL 2 TIMES DAILY WITH MEALS
Qty: 180 TABLET | Refills: 0 | Status: SHIPPED | OUTPATIENT
Start: 2023-09-22

## 2023-09-22 NOTE — TELEPHONE ENCOUNTER
To scheduling: Pls reach out to pt and get him rescheduled.     Courtesy refill due to provider cancellation.

## 2023-10-06 ENCOUNTER — TELEPHONE (OUTPATIENT)
Dept: CARDIOLOGY | Facility: MEDICAL CENTER | Age: 63
End: 2023-10-06
Payer: COMMERCIAL

## 2023-10-06 NOTE — TELEPHONE ENCOUNTER
Called pt to schd f/v. Pt wants to talk to wife prior to schd. Frustrated his appts kept getting r/s. Offered diff provider, he will discuss w/ wife and call us back. /cg 10/06/23

## 2024-07-31 NOTE — ASSESSMENT & PLAN NOTE
Encourage diet exercise and weight loss   no lesions, no deformities, no traumatic injuries, no significant scars are present, chest wall non-tender, no masses present, breathing is unlabored without accessory muscle use,normal breath sounds

## 2025-03-07 LAB
CHOLEST SERPL-MCNC: 98 MG/DL
HDLC SERPL-MCNC: 54 MG/DL
LDLC SERPL CALC-MCNC: 33 MG/DL
TRIGL SERPL-MCNC: 56 MG/DL

## 2025-03-25 ENCOUNTER — TELEPHONE (OUTPATIENT)
Dept: CARDIOLOGY | Facility: MEDICAL CENTER | Age: 65
End: 2025-03-25

## 2025-03-25 ENCOUNTER — TELEMEDICINE (OUTPATIENT)
Dept: CARDIOLOGY | Facility: MEDICAL CENTER | Age: 65
End: 2025-03-25
Attending: NURSE PRACTITIONER
Payer: COMMERCIAL

## 2025-03-25 VITALS — WEIGHT: 212 LBS | OXYGEN SATURATION: 95 % | BODY MASS INDEX: 30.42 KG/M2

## 2025-03-25 DIAGNOSIS — I10 ESSENTIAL HYPERTENSION, BENIGN: ICD-10-CM

## 2025-03-25 DIAGNOSIS — Z95.5 STATUS POST CORONARY ARTERY STENT PLACEMENT: ICD-10-CM

## 2025-03-25 DIAGNOSIS — E78.5 DYSLIPIDEMIA: ICD-10-CM

## 2025-03-25 DIAGNOSIS — I25.5 ACC/AHA STAGE B SYSTOLIC HEART FAILURE DUE TO ISCHEMIC CARDIOMYOPATHY (HCC): ICD-10-CM

## 2025-03-25 DIAGNOSIS — I25.83 CORONARY ARTERY DISEASE DUE TO LIPID RICH PLAQUE: ICD-10-CM

## 2025-03-25 DIAGNOSIS — I50.20 ACC/AHA STAGE B SYSTOLIC HEART FAILURE DUE TO ISCHEMIC CARDIOMYOPATHY (HCC): ICD-10-CM

## 2025-03-25 DIAGNOSIS — E11.59 TYPE 2 DIABETES MELLITUS WITH OTHER CIRCULATORY COMPLICATION, WITHOUT LONG-TERM CURRENT USE OF INSULIN (HCC): ICD-10-CM

## 2025-03-25 DIAGNOSIS — I25.10 CORONARY ARTERY DISEASE DUE TO LIPID RICH PLAQUE: ICD-10-CM

## 2025-03-25 PROCEDURE — 99214 OFFICE O/P EST MOD 30 MIN: CPT | Mod: 95 | Performed by: NURSE PRACTITIONER

## 2025-03-25 RX ORDER — EZETIMIBE 10 MG/1
TABLET ORAL
COMMUNITY
Start: 2025-03-12

## 2025-03-25 RX ORDER — LISINOPRIL 5 MG/1
5 TABLET ORAL
COMMUNITY
Start: 2025-03-22

## 2025-03-25 RX ORDER — CLOPIDOGREL BISULFATE 75 MG/1
75 TABLET ORAL DAILY
Qty: 90 TABLET | Refills: 3 | Status: SHIPPED | OUTPATIENT
Start: 2025-03-25

## 2025-03-25 RX ORDER — SEMAGLUTIDE 1.34 MG/ML
INJECTION, SOLUTION SUBCUTANEOUS
COMMUNITY

## 2025-03-25 NOTE — PROGRESS NOTES
Chief Complaint   Patient presents with    Other     F/V Dx: ACC/AHA stage B systolic heart failure due to ischemic cardiomyopathy (HCC)    Dyslipidemia    Coronary Artery Disease     F/V Dx: Coronary artery disease involving native coronary artery of native heart without angina pectoris     This evaluation was conducted via Teams using secure and encrypted videoconferencing technology. The patient was in their home in the [facility].    The patient's identity was confirmed and verbal consent was obtained for this virtual visit.    Subjective     Michael Hays is a 61 y.o. male who presents today for recent hospitalization for NSTEMI and reduced EF on 3/24/2022.  Patient s/p PCI of ramus intermedius, LAD and D1.   left circumflex.    In addition to above patient has following medical problems of hyperlipidemia and T2DM.  Patient history of smoking, recently quit.    Patient last seen by KEITH Cowan on 4/28/2022, where echo was ordered and patient to discuss with Dr. Fuentes    Since patient was last seen, he has been following with his primary care provider to optimize his diabetes, blood pressure, and weight.  Patient reports he is lost 45 pounds since last seen.  His A1c is now 6.2.  Patient reports blood pressure is not well-controlled.  Patient was on monotherapy antiplatelet with Effient.  Patient reports lipids are also well-controlled.  Patient exercises regularly, denies chest pain.  Patient also denies shortness of breath, palpitations, dizziness/lightheadedness, orthopnea, PND or Edema.     We will transition patient to clopidogrel monotherapy.  Will continue carvedilol and lisinopril per PCP.  Will request lab results and PCP office visit note.  Will obtain follow-up echocardiogram as this has not been completed since last visit.  Patient to follow-up with Dr. Fuentes after echocardiogram results to discuss, sooner if needed.    Past Medical History:   Diagnosis Date    Cataract      Diabetes (HCC)     Indigestion      History reviewed. No pertinent surgical history.  History reviewed. No pertinent family history.  Social History     Socioeconomic History    Marital status:      Spouse name: Not on file    Number of children: Not on file    Years of education: Not on file    Highest education level: Not on file   Occupational History    Not on file   Tobacco Use    Smoking status: Former     Current packs/day: 0.00     Types: Cigarettes     Quit date: 3/23/2022     Years since quitting: 3.0    Smokeless tobacco: Never   Vaping Use    Vaping status: Never Used   Substance and Sexual Activity    Alcohol use: Not on file    Drug use: Never    Sexual activity: Not on file   Other Topics Concern    Not on file   Social History Narrative    Not on file     Social Drivers of Health     Financial Resource Strain: Not on file   Food Insecurity: Not on file   Transportation Needs: Not on file   Physical Activity: Not on file   Stress: Not on file   Social Connections: Not on file   Intimate Partner Violence: Not on file   Housing Stability: Not on file     No Known Allergies  Outpatient Encounter Medications as of 3/25/2025   Medication Sig Dispense Refill    Semaglutide, 1 MG/DOSE, (OZEMPIC, 1 MG/DOSE,) 2 MG/1.5ML Solution Pen-injector Inject  under the skin.      ezetimibe (ZETIA) 10 MG Tab       lisinopril (PRINIVIL) 5 MG Tab 5 mg.      clopidogrel (PLAVIX) 75 MG Tab Take 1 Tablet by mouth every day. 90 Tablet 3    carvedilol (COREG) 6.25 MG Tab Take 1 Tablet by mouth 2 times a day with meals. PLEASE CALL [phone] TO SCHEDULE A FOLLOW UP APPOINTMENT FOR FURTHER REFILLS. THANK YOU! 180 Tablet 0    carisoprodol (SOMA) 350 MG Tab Take 350 mg by mouth.      Dapagliflozin Propanediol 10 MG Tab Take 10 mg by mouth every day. 30 Tablet 11    metformin (GLUCOPHAGE) 1000 MG tablet Take 1,000 mg by mouth 2 times a day with meals.      atorvastatin (LIPITOR) 40 MG Tab Take 40 mg by mouth every evening.       [DISCONTINUED] prasugrel (EFFIENT) 10 MG Tab Take 1 Tablet by mouth every day. PLEASE CALL [phone] TO SCHEDULE A FOLLOW UP APPOINTMENT FOR FURTHER REFILLS. THANK YOU! 90 Tablet 0    glyBURIDE (DIABETA) 5 MG Tab Take 5 mg by mouth in the morning, at noon, and at bedtime. (Patient not taking: Reported on 3/25/2025)      SEMGLEE, YFGN, 100 UNIT/ML Solution Pen-injector Inject 24 Units as directed every day. (Patient not taking: Reported on 3/25/2025)      BD PEN NEEDLE ANDREW U/F  (Patient not taking: Reported on 3/25/2025)       No facility-administered encounter medications on file as of 3/25/2025.     ROS Complete review of systems negative except as noted in HPI/subjective           Objective     Wt 96.2 kg (212 lb)   SpO2 95%   BMI 30.42 kg/m²     Physical Exam  Physical Exam:  Constitutional: Alert, no distress, well-groomed.  Skin: No rashes in visible areas.  Eye: Round. Conjunctiva clear, lids normal. No icterus.   ENMT: Lips pink without lesions, good dentition, moist mucous membranes. Phonation normal.  Neck: No masses, no thyromegaly. Moves freely without pain.  CV: Pulse as reported by patient  Respiratory: Unlabored respiratory effort, no cough or audible wheeze  Psych: Alert and oriented x3, normal affect and mood.        Lab Results   Component Value Date/Time    CHOLSTRLTOT 104 03/24/2022 04:06 AM    LDL 45 03/24/2022 04:06 AM    HDL 40 03/24/2022 04:06 AM    TRIGLYCERIDE 94 03/24/2022 04:06 AM       Lab Results   Component Value Date/Time    SODIUM 138 03/28/2022 02:47 AM    POTASSIUM 4.0 03/28/2022 02:47 AM    CHLORIDE 107 03/28/2022 02:47 AM    CO2 20 03/28/2022 02:47 AM    GLUCOSE 126 (H) 03/28/2022 02:47 AM    BUN 9 03/28/2022 02:47 AM    CREATININE 0.63 03/28/2022 02:47 AM     Lab Results   Component Value Date/Time    ALKPHOSPHAT 97 03/25/2022 01:29 AM    ASTSGOT 17 03/25/2022 01:29 AM    ALTSGPT 17 03/25/2022 01:29 AM    TBILIRUBIN 0.3 03/25/2022 01:29 AM      [facility]  (3/25/2022):  HEMODYNAMICS:  Aortic pressure: 92/63/76  mmHg  CORONARY ANGIOGRAPHY:  The left main coronary artery: Well-appearing, trifurcates to an LAD, ramus intermedius and circumflex.  Ramus intermedius coronary artery: Severe 95 to 99% proximal stenosis.  The left anterior descending coronary artery: Severe 99% stenosis in the mid LAD right at the takeoff of the diagonal branch. Severe 95-99% stenosis in the ostial and mid diagonal branch.  The left circumflex coronary artery: Normal appearing except for 100% occlusion of one of the branches of a large OM artery.  The true circumflex tapers into the AV groove  The right coronary artery: Dominant, mild 30% proximal stenosis, severe 95-99% diffuse disease in the PL branches.  IMPRESSION:  1.  Severe three-vessel disease including proximal-mid LAD /mid diagonal, proximal ramus intermedius artery and large in caliber obtuse marginal artery.  Diffuse severe disease in the PL branches.  2.  High-normal resting LVEDP with no significant transaortic gradient on pullback.  RECOMMENDATIONS:  Appreciate the surgical consultation for CABG evaluation.   Resume heparin drip pending revascularization strategy.    [facility] (3/27/2022)  POSTOPERATIVE DIAGNOSIS:  Successful PCI ramus intermedius (2.25 x 22 mm Slaughter CHELE), excellent result  Successful complex bifurcation PCI LAD and D1, excellent result  Residual  LCx and small vessel distal RCA disease     RECOMMENDATIONS:  Guideline directed medical therapy   Cardiovascular Risk factor modification  Dual antiplatelet therapy for minimum of 1 year, lifelong as tolerated    ECHO(3/24/22):   CONCLUSIONS  Fair quality study, improved with contrast.  The left ventricular ejection fraction is visually estimated to be 30-  35%, moderately reduced.  Hypokinesis to akinesis of the mid anterior wall and entire apex.  Thinning and hypo to akinesis of the basal to mid inferolateral wall.  [place] is partially aneurysmal.  No LV thrombus  appreciated.  Mild mitral regurgitation.  Normal estimated right atrial pressure, unable to estimate RVSP.    [facility] (3/27/2022):   INDICATION/PREOPERATIVE DIAGNOSIS:  Cardiomyopathy  Multivessel coronary artery disease     POSTOPERATIVE DIAGNOSIS:  Successful PCI ramus intermedius (2.25 x 22 mm Baljinder CHELE), excellent result  Successful complex bifurcation PCI LAD and D1, excellent result  Residual  LCx and small vessel distal RCA disease     RECOMMENDATIONS:  Guideline directed medical therapy   Cardiovascular Risk factor modification  Dual antiplatelet therapy for minimum of 1 year lifelong as tolerated     PROCEDURES PERFORMED:  Left heart catheterization   Supervision moderate sedation  Percutaneous coronary intervention ramus intermedius  Continue current intervention LAD and diagonal  FINDINGS:  I.  HEMODYNAMICS              Ao: 81/58 mmHg              LEDP: 12 mmHg              Gradient on LV pullback: No    Assessment & Plan     1. Coronary artery disease due to lipid rich plaque  clopidogrel (PLAVIX) 75 MG Tab    EC-ECHOCARDIOGRAM COMPLETE W/O CONT            Medical Decision Making: Today's Assessment/Status/Plan:        HFrEF, Stage B, Class I, LVEF 30-35%:   -Heart failure due to ischemic cardiomyopathy  -ACE-I/ARB/ARNI: lisinopril 5 mg daily  -Evidence Based Beta-blocker: coreg 6.25 mg BID  -Aldosterone Antagonist: defer until FU echo  -SGLT2-I: continue farxiga 10 mg daily  -Diuretic: euvolemic on exam  -Labs:   -Repeat Echo pending, if LVEF not >35%, then will discuss/consider ICD for primary Prevention.   -Reinforced s/sx of worsening heart failure with patient and weight monitoring. Pt verbalizes understanding. Pt to call office if present.    -Heart Failure Education: pt to be contacted by HF nurse for further education  -Advanced care planning: Advanced directive and POLST discuss at next follow-up    CAD, NSTEMI, s/p CHELE/PCI to ramus intermedius, LAD and D1.   left circumflex.: Continue DAPT x 1  year; HLD  -Unclear when patient transitioned to monotherapy, will request PCP records  -Transition effient to plavix 75 mg daily  -Continue high intensity statin at atorvastatin 40 mg daily  -LDL 45 previously. At goal  -Cardiac rehab at [facility]  -Discussed worsening symptoms of chest pain, patient to go the emergency room    T2DM  -A1C improved to 6.7  -metformin  -per PCP    FU in clinic after echo with Dr. Fuentes. Sooner if needed.    Patient verbalizes understanding and agrees with the plan of care.     I personally spent a total of 31 minutes which includes face-to-face time and non-face-to-face time spent on preparing to see the patient, reviewing prior notes and tests, obtaining history from the patient, performing a medically appropriate exam, counseling and educating the patient, ordering medications/tests/procedures/referrals as clinically indicated, and documenting information in the electronic medical record.    LIA Reeder.   [facility]  [phone]    PLEASE NOTE: This Note was created using voice recognition Software. I have made every reasonable attempt to correct obvious errors, but I expect that there are errors of grammar and possibly content that I did not discover before finalizing the note

## 2025-03-25 NOTE — TELEPHONE ENCOUNTER
Requested records from [facility] using F#[id number]. Records requested were most recent PCP visit notes and recent labs. Received confirmation that it was sent and conformation sent to scan.

## 2025-03-26 NOTE — TELEPHONE ENCOUNTER
"Received records of last office note from PCP. Scanned into media and can be found under \"[facility]\".    Received records of recent labs. Scanned into media under \"lab results\".  "

## 2025-03-27 ENCOUNTER — RESULTS FOLLOW-UP (OUTPATIENT)
Dept: CARDIOLOGY | Facility: MEDICAL CENTER | Age: 65
End: 2025-03-27
